# Patient Record
Sex: FEMALE | Race: WHITE | Employment: OTHER | URBAN - METROPOLITAN AREA
[De-identification: names, ages, dates, MRNs, and addresses within clinical notes are randomized per-mention and may not be internally consistent; named-entity substitution may affect disease eponyms.]

---

## 2021-03-07 ENCOUNTER — HOSPITAL ENCOUNTER (OUTPATIENT)
Dept: PREADMISSION TESTING | Age: 86
Discharge: HOME | End: 2021-03-07
Payer: MEDICARE

## 2021-03-07 LAB — SARS-COV-2, COV2: NORMAL

## 2021-03-07 PROCEDURE — U0003 INFECTIOUS AGENT DETECTION BY NUCLEIC ACID (DNA OR RNA); SEVERE ACUTE RESPIRATORY SYNDROME CORONAVIRUS 2 (SARS-COV-2) (CORONAVIRUS DISEASE [COVID-19]), AMPLIFIED PROBE TECHNIQUE, MAKING USE OF HIGH THROUGHPUT TECHNOLOGIES AS DESCRIBED BY CMS-2020-01-R: HCPCS

## 2021-03-08 LAB — SARS-COV-2, COV2NT: NOT DETECTED

## 2021-03-11 ENCOUNTER — HOSPITAL ENCOUNTER (OUTPATIENT)
Age: 86
Setting detail: OUTPATIENT SURGERY
Discharge: HOME | End: 2021-03-11
Attending: INTERNAL MEDICINE | Admitting: INTERNAL MEDICINE
Payer: MEDICARE

## 2021-03-11 ENCOUNTER — APPOINTMENT (OUTPATIENT)
Dept: ENDOSCOPY | Age: 86
End: 2021-03-11
Attending: INTERNAL MEDICINE
Payer: MEDICARE

## 2021-03-11 VITALS
DIASTOLIC BLOOD PRESSURE: 111 MMHG | TEMPERATURE: 97.9 F | BODY MASS INDEX: 31.22 KG/M2 | WEIGHT: 159 LBS | OXYGEN SATURATION: 97 % | SYSTOLIC BLOOD PRESSURE: 188 MMHG | RESPIRATION RATE: 16 BRPM | HEART RATE: 86 BPM | HEIGHT: 60 IN

## 2021-03-11 LAB — SARS-COV-2, COV2: NORMAL

## 2021-03-11 PROCEDURE — 74011250637 HC RX REV CODE- 250/637: Performed by: ANESTHESIOLOGY

## 2021-03-11 PROCEDURE — 74011250636 HC RX REV CODE- 250/636: Performed by: INTERNAL MEDICINE

## 2021-03-11 PROCEDURE — U0003 INFECTIOUS AGENT DETECTION BY NUCLEIC ACID (DNA OR RNA); SEVERE ACUTE RESPIRATORY SYNDROME CORONAVIRUS 2 (SARS-COV-2) (CORONAVIRUS DISEASE [COVID-19]), AMPLIFIED PROBE TECHNIQUE, MAKING USE OF HIGH THROUGHPUT TECHNOLOGIES AS DESCRIBED BY CMS-2020-01-R: HCPCS

## 2021-03-11 RX ORDER — METOPROLOL TARTRATE 25 MG/1
25 TABLET, FILM COATED ORAL 2 TIMES DAILY
COMMUNITY

## 2021-03-11 RX ORDER — CYANOCOBALAMIN 1000 UG/ML
1000 INJECTION, SOLUTION INTRAMUSCULAR; SUBCUTANEOUS ONCE
COMMUNITY

## 2021-03-11 RX ORDER — SODIUM CHLORIDE, SODIUM LACTATE, POTASSIUM CHLORIDE, CALCIUM CHLORIDE 600; 310; 30; 20 MG/100ML; MG/100ML; MG/100ML; MG/100ML
50 INJECTION, SOLUTION INTRAVENOUS CONTINUOUS
Status: DISCONTINUED | OUTPATIENT
Start: 2021-03-11 | End: 2021-03-11 | Stop reason: HOSPADM

## 2021-03-11 RX ORDER — LISINOPRIL 20 MG/1
10 TABLET ORAL DAILY
COMMUNITY

## 2021-03-11 RX ORDER — ASPIRIN 81 MG/1
81 TABLET ORAL DAILY
COMMUNITY

## 2021-03-11 RX ORDER — HYDRALAZINE HYDROCHLORIDE 10 MG/1
10 TABLET, FILM COATED ORAL ONCE
Status: COMPLETED | OUTPATIENT
Start: 2021-03-11 | End: 2021-03-11

## 2021-03-11 RX ADMIN — HYDRALAZINE HYDROCHLORIDE 10 MG: 10 TABLET, FILM COATED ORAL at 09:37

## 2021-03-11 RX ADMIN — SODIUM CHLORIDE, POTASSIUM CHLORIDE, SODIUM LACTATE AND CALCIUM CHLORIDE 50 ML/HR: 600; 310; 30; 20 INJECTION, SOLUTION INTRAVENOUS at 09:37

## 2021-03-11 NOTE — PROGRESS NOTES
Patient reports she has not taken any of her medications including blood pressure medication x 4 days. Dr. Eitan Limon at patient's bedside and notified patient and patient's son that her procedure is cancelled today due to risk to patient from hypertension. Patient and patient's son in agreement to cancel procedure today and reschedule. Dr. Eitan Limon instructed patient to resume home blood pressure medications as previously prescribed. Patient and patient's son verbalize understanding and patient states she is going to take home blood pressure medication as soon as she gets home. New orders received from Dr. Amelia Contreras for patient to return to [facility] at 11:00 a.m. on Monday 3-15-21 for procedure and to continue to hold xarelto before procedure, but to continue home blood pressure medications. Patient and patient's son verbalize understanding of Dr. Celi Russo orders and are in agreement with returning to [facility] on Monday 3-15-21 for procedure.

## 2021-03-11 NOTE — PERIOP NOTES
Patient alert and oriented x 4 with respirations even and unlabored on RA. Patient discharged home per physician's order. Patient transported via wheelchair to front hospital entrance with son present to transport patient via private vehicle.

## 2021-03-11 NOTE — PROGRESS NOTES
Dr Bianca Muir made aware of patient /103 (R), 196/117 (L), 188/111(L). Order for hydralazine 10mg PO received. Will continue to monitor.

## 2021-03-12 LAB — SARS-COV-2, COV2NT: NOT DETECTED

## 2021-03-15 ENCOUNTER — ANESTHESIA (OUTPATIENT)
Dept: ENDOSCOPY | Age: 86
End: 2021-03-15
Payer: MEDICARE

## 2021-03-15 ENCOUNTER — HOSPITAL ENCOUNTER (OUTPATIENT)
Age: 86
Setting detail: OUTPATIENT SURGERY
Discharge: HOME | End: 2021-03-15
Attending: INTERNAL MEDICINE | Admitting: INTERNAL MEDICINE
Payer: MEDICARE

## 2021-03-15 ENCOUNTER — ANESTHESIA EVENT (OUTPATIENT)
Dept: ENDOSCOPY | Age: 86
End: 2021-03-15
Payer: MEDICARE

## 2021-03-15 ENCOUNTER — APPOINTMENT (OUTPATIENT)
Dept: ENDOSCOPY | Age: 86
End: 2021-03-15
Attending: INTERNAL MEDICINE
Payer: MEDICARE

## 2021-03-15 VITALS
WEIGHT: 159 LBS | DIASTOLIC BLOOD PRESSURE: 68 MMHG | RESPIRATION RATE: 18 BRPM | HEART RATE: 65 BPM | TEMPERATURE: 97.3 F | SYSTOLIC BLOOD PRESSURE: 127 MMHG | BODY MASS INDEX: 31.22 KG/M2 | HEIGHT: 60 IN | OXYGEN SATURATION: 95 %

## 2021-03-15 PROCEDURE — 74011000250 HC RX REV CODE- 250: Performed by: NURSE ANESTHETIST, CERTIFIED REGISTERED

## 2021-03-15 PROCEDURE — 74011250636 HC RX REV CODE- 250/636: Performed by: INTERNAL MEDICINE

## 2021-03-15 PROCEDURE — 2709999900 HC NON-CHARGEABLE SUPPLY: Performed by: INTERNAL MEDICINE

## 2021-03-15 PROCEDURE — 74011250636 HC RX REV CODE- 250/636: Performed by: NURSE ANESTHETIST, CERTIFIED REGISTERED

## 2021-03-15 PROCEDURE — 76060000033 HC ANESTHESIA 1 TO 1.5 HR: Performed by: INTERNAL MEDICINE

## 2021-03-15 PROCEDURE — 76040000008: Performed by: INTERNAL MEDICINE

## 2021-03-15 PROCEDURE — 74011250636 HC RX REV CODE- 250/636: Performed by: ANESTHESIOLOGY

## 2021-03-15 RX ORDER — DEXAMETHASONE SODIUM PHOSPHATE 4 MG/ML
INJECTION, SOLUTION INTRA-ARTICULAR; INTRALESIONAL; INTRAMUSCULAR; INTRAVENOUS; SOFT TISSUE
Status: DISCONTINUED
Start: 2021-03-15 | End: 2021-03-15 | Stop reason: HOSPADM

## 2021-03-15 RX ORDER — SODIUM CHLORIDE, SODIUM LACTATE, POTASSIUM CHLORIDE, CALCIUM CHLORIDE 600; 310; 30; 20 MG/100ML; MG/100ML; MG/100ML; MG/100ML
INJECTION, SOLUTION INTRAVENOUS
Status: DISCONTINUED | OUTPATIENT
Start: 2021-03-15 | End: 2021-03-15 | Stop reason: HOSPADM

## 2021-03-15 RX ORDER — PROPOFOL 10 MG/ML
INJECTION, EMULSION INTRAVENOUS AS NEEDED
Status: DISCONTINUED | OUTPATIENT
Start: 2021-03-15 | End: 2021-03-15 | Stop reason: HOSPADM

## 2021-03-15 RX ORDER — SODIUM CHLORIDE, SODIUM LACTATE, POTASSIUM CHLORIDE, CALCIUM CHLORIDE 600; 310; 30; 20 MG/100ML; MG/100ML; MG/100ML; MG/100ML
50 INJECTION, SOLUTION INTRAVENOUS CONTINUOUS
Status: DISCONTINUED | OUTPATIENT
Start: 2021-03-15 | End: 2021-03-15 | Stop reason: HOSPADM

## 2021-03-15 RX ORDER — HYDRALAZINE HYDROCHLORIDE 20 MG/ML
10 INJECTION INTRAMUSCULAR; INTRAVENOUS
Status: COMPLETED | OUTPATIENT
Start: 2021-03-15 | End: 2021-03-15

## 2021-03-15 RX ORDER — LIDOCAINE HYDROCHLORIDE 20 MG/ML
INJECTION, SOLUTION EPIDURAL; INFILTRATION; INTRACAUDAL; PERINEURAL AS NEEDED
Status: DISCONTINUED | OUTPATIENT
Start: 2021-03-15 | End: 2021-03-15 | Stop reason: HOSPADM

## 2021-03-15 RX ORDER — FENTANYL CITRATE 50 UG/ML
INJECTION, SOLUTION INTRAMUSCULAR; INTRAVENOUS
Status: DISCONTINUED
Start: 2021-03-15 | End: 2021-03-15 | Stop reason: WASHOUT

## 2021-03-15 RX ORDER — DEXMEDETOMIDINE HYDROCHLORIDE 100 UG/ML
INJECTION, SOLUTION INTRAVENOUS
Status: COMPLETED
Start: 2021-03-15 | End: 2021-03-15

## 2021-03-15 RX ORDER — DEXMEDETOMIDINE HYDROCHLORIDE 100 UG/ML
INJECTION, SOLUTION INTRAVENOUS AS NEEDED
Status: DISCONTINUED | OUTPATIENT
Start: 2021-03-15 | End: 2021-03-15 | Stop reason: HOSPADM

## 2021-03-15 RX ORDER — PROPOFOL 10 MG/ML
INJECTION, EMULSION INTRAVENOUS
Status: COMPLETED
Start: 2021-03-15 | End: 2021-03-15

## 2021-03-15 RX ORDER — LIDOCAINE HYDROCHLORIDE 20 MG/ML
INJECTION, SOLUTION EPIDURAL; INFILTRATION; INTRACAUDAL; PERINEURAL
Status: COMPLETED
Start: 2021-03-15 | End: 2021-03-15

## 2021-03-15 RX ADMIN — PROPOFOL 70 MG: 10 INJECTION, EMULSION INTRAVENOUS at 12:20

## 2021-03-15 RX ADMIN — HYDRALAZINE HYDROCHLORIDE 10 MG: 20 INJECTION INTRAMUSCULAR; INTRAVENOUS at 11:34

## 2021-03-15 RX ADMIN — PHENYLEPHRINE HYDROCHLORIDE 100 MCG: 10 INJECTION INTRAVENOUS at 12:30

## 2021-03-15 RX ADMIN — SODIUM CHLORIDE, POTASSIUM CHLORIDE, SODIUM LACTATE AND CALCIUM CHLORIDE 50 ML/HR: 600; 310; 30; 20 INJECTION, SOLUTION INTRAVENOUS at 11:35

## 2021-03-15 RX ADMIN — PROPOFOL 30 MG: 10 INJECTION, EMULSION INTRAVENOUS at 12:39

## 2021-03-15 RX ADMIN — DEXMEDETOMIDINE HYDROCHLORIDE 10 MCG: 100 INJECTION, SOLUTION INTRAVENOUS at 12:24

## 2021-03-15 RX ADMIN — DEXMEDETOMIDINE HYDROCHLORIDE 10 MCG: 100 INJECTION, SOLUTION INTRAVENOUS at 12:38

## 2021-03-15 RX ADMIN — LIDOCAINE HYDROCHLORIDE 80 MG: 20 INJECTION, SOLUTION EPIDURAL; INFILTRATION; INTRACAUDAL; PERINEURAL at 12:20

## 2021-03-15 RX ADMIN — PHENYLEPHRINE HYDROCHLORIDE 100 MCG: 10 INJECTION INTRAVENOUS at 12:54

## 2021-03-15 RX ADMIN — PHENYLEPHRINE HYDROCHLORIDE 100 MCG: 10 INJECTION INTRAVENOUS at 12:38

## 2021-03-15 RX ADMIN — PHENYLEPHRINE HYDROCHLORIDE 100 MCG: 10 INJECTION INTRAVENOUS at 12:28

## 2021-03-15 RX ADMIN — PHENYLEPHRINE HYDROCHLORIDE 100 MCG: 10 INJECTION INTRAVENOUS at 12:50

## 2021-03-15 RX ADMIN — PROPOFOL 30 MG: 10 INJECTION, EMULSION INTRAVENOUS at 12:46

## 2021-03-15 RX ADMIN — PROPOFOL 40 MG: 10 INJECTION, EMULSION INTRAVENOUS at 12:25

## 2021-03-15 RX ADMIN — SODIUM CHLORIDE, POTASSIUM CHLORIDE, SODIUM LACTATE AND CALCIUM CHLORIDE: 600; 310; 30; 20 INJECTION, SOLUTION INTRAVENOUS at 12:06

## 2021-03-15 RX ADMIN — PROPOFOL 40 MG: 10 INJECTION, EMULSION INTRAVENOUS at 12:30

## 2021-03-15 NOTE — ANESTHESIA PREPROCEDURE EVALUATION
Relevant Problems   No relevant active problems       Anesthetic History   No history of anesthetic complications            Review of Systems / Medical History  Patient summary reviewed, nursing notes reviewed and pertinent labs reviewed    Pulmonary  Within defined limits                 Neuro/Psych       CVA  TIA     Cardiovascular    Hypertension        Dysrhythmias   CAD, PAD and hyperlipidemia         GI/Hepatic/Renal                Endo/Other             Other Findings   Comments: Allergies  Cucumber  Ht: 5' (152.4 cm)  Weight: 72.1 kg (159 lb)  BMI: 31.05 kg/m²  Procedure  ENDOSCOPIC ULTRASOUND WITH FINE NEEDLE ASPIRATION (N/A [facility] GI [facility])  Pre-Proc, [facility] END[facility] 03      Medical History  CAD (coronary artery disease)  Hypertension  Stroke ([facility] [address]  A-fib (HCC)  Hyperlipidemia  PVD (peripheral vascular disease) ([facility] [address]  CAD (coronary artery disease)  Carotid stent occlusion ([facility])           Physical Exam    Airway  Mallampati: II  TM Distance: 4 - 6 cm  Neck ROM: normal range of motion   Mouth opening: Normal     Cardiovascular    Rhythm: regular  Rate: normal         Dental  No notable dental hx       Pulmonary  Breath sounds clear to auscultation               Abdominal  GI exam deferred       Other Findings            Anesthetic Plan    ASA: 3  Anesthesia type: total IV anesthesia and general          Induction: Intravenous  Anesthetic plan and risks discussed with: Patient      General anesthesia was prescribed for this patient because by definition it is \"a drug-induced loss of consciousness during which patients are not arousable, even by painful stimulation. \" Sometimes, the ability to independently maintain ventilatory function is often impaired and patients often require assistance in maintaining a patent airway. Occasionally, positive pressure ventilation may be required because of depressed spontaneous ventilation or drug-induced depression of neuromuscular function.  This depth of anesthesia is preferred for endoscopic procedures to facilitate the procedure and for patient safety/quality of care.

## 2021-03-15 NOTE — ANESTHESIA POSTPROCEDURE EVALUATION
Procedure(s):  ENDOSCOPIC ULTRASOUND WITH FINE NEEDLE ASPIRATION.     total IV anesthesia, general    Anesthesia Post Evaluation      Multimodal analgesia: multimodal analgesia not used between 6 hours prior to anesthesia start to PACU discharge  Patient location during evaluation: bedside  Patient participation: waiting for patient participation  Level of consciousness: sleepy but conscious  Pain score: 0  Airway patency: patent  Anesthetic complications: no  Cardiovascular status: stable  Respiratory status: spontaneous ventilation  Hydration status: stable    Final Post Anesthesia Temperature Assessment:  Normothermia (36.0-37.5 degrees C)      INITIAL Post-op Vital signs:   Vitals Value Taken Time   /74 03/15/21 1255   Temp 36.5 °C (97.7 °F) 03/15/21 1255   Pulse 69 03/15/21 1255   Resp 22 03/15/21 1255   SpO2 100 % 03/15/21 1255

## 2021-03-15 NOTE — PERIOP NOTES
Dr. Nito Sol made aware of elevated blood pressure 187/107 in preop. 10mg IV hydralazine ordered and given by RN.

## 2021-03-15 NOTE — PROGRESS NOTES
Bolus given as ordered. Pt up to dress. No complaints. DC instructions reviewed verb an understanding.

## 2021-09-24 ENCOUNTER — TRANSCRIBE ORDER (OUTPATIENT)
Dept: SCHEDULING | Age: 86
End: 2021-09-24

## 2021-09-24 DIAGNOSIS — R19.4 FREQUENT BOWEL MOVEMENTS: ICD-10-CM

## 2021-09-24 DIAGNOSIS — R10.9 ABDOMINAL PAIN: ICD-10-CM

## 2021-09-24 DIAGNOSIS — K86.2 CYST AND PSEUDOCYST OF PANCREAS: Primary | ICD-10-CM

## 2021-09-24 DIAGNOSIS — K86.3 CYST AND PSEUDOCYST OF PANCREAS: Primary | ICD-10-CM

## 2021-10-19 ENCOUNTER — HOSPITAL ENCOUNTER (OUTPATIENT)
Dept: CT IMAGING | Age: 86
Discharge: HOME | End: 2021-10-19
Payer: MEDICARE

## 2021-10-19 DIAGNOSIS — R10.9 ABDOMINAL PAIN: ICD-10-CM

## 2021-10-19 DIAGNOSIS — K86.2 CYST AND PSEUDOCYST OF PANCREAS: ICD-10-CM

## 2021-10-19 DIAGNOSIS — K86.3 CYST AND PSEUDOCYST OF PANCREAS: ICD-10-CM

## 2021-10-19 DIAGNOSIS — R19.4 FREQUENT BOWEL MOVEMENTS: ICD-10-CM

## 2021-10-19 LAB — CREAT BLD-MCNC: 0.9 MG/DL (ref 0.6–1.3)

## 2021-10-19 PROCEDURE — 74178 CT ABD&PLV WO CNTR FLWD CNTR: CPT

## 2021-10-19 PROCEDURE — 82565 ASSAY OF CREATININE: CPT

## 2021-10-19 PROCEDURE — 74011000636 HC RX REV CODE- 636

## 2021-10-19 RX ADMIN — IOPAMIDOL 100 ML: 755 INJECTION, SOLUTION INTRAVENOUS at 08:35

## 2023-05-12 RX ORDER — CYANOCOBALAMIN 1000 UG/ML
INJECTION, SOLUTION INTRAMUSCULAR; SUBCUTANEOUS ONCE
COMMUNITY

## 2023-05-12 RX ORDER — LISINOPRIL 20 MG/1
10 TABLET ORAL DAILY
COMMUNITY

## 2023-05-12 RX ORDER — ASPIRIN 81 MG/1
81 TABLET ORAL DAILY
COMMUNITY

## 2024-01-16 ENCOUNTER — HOSPITAL ENCOUNTER (INPATIENT)
Facility: HOSPITAL | Age: 89
LOS: 8 days | Discharge: NURSING, REHAB OR LONG-TERM CARE | DRG: 291 | End: 2024-01-24
Attending: EMERGENCY MEDICINE | Admitting: HOSPITALIST
Payer: MEDICARE

## 2024-01-16 ENCOUNTER — APPOINTMENT (OUTPATIENT)
Facility: HOSPITAL | Age: 89
DRG: 291 | End: 2024-01-16
Payer: MEDICARE

## 2024-01-16 DIAGNOSIS — J96.01 ACUTE RESPIRATORY FAILURE WITH HYPOXIA (HCC): Primary | ICD-10-CM

## 2024-01-16 DIAGNOSIS — D64.9 ANEMIA, UNSPECIFIED TYPE: ICD-10-CM

## 2024-01-16 PROBLEM — I10 ACCELERATED ESSENTIAL HYPERTENSION: Status: ACTIVE | Noted: 2024-01-16

## 2024-01-16 LAB
ALBUMIN SERPL-MCNC: 3.5 G/DL (ref 3.5–5)
ALBUMIN/GLOB SERPL: 1.2 (ref 1.1–2.2)
ALP SERPL-CCNC: 84 U/L (ref 45–117)
ALT SERPL-CCNC: 14 U/L (ref 12–78)
ANION GAP SERPL CALC-SCNC: 5 MMOL/L (ref 5–15)
AST SERPL W P-5'-P-CCNC: 14 U/L (ref 15–37)
BASOPHILS # BLD: 0.1 K/UL (ref 0–0.1)
BASOPHILS NFR BLD: 2 % (ref 0–1)
BILIRUB SERPL-MCNC: 0.7 MG/DL (ref 0.2–1)
BNP SERPL-MCNC: 4759 PG/ML
BUN SERPL-MCNC: 22 MG/DL (ref 6–20)
BUN/CREAT SERPL: 29 (ref 12–20)
CA-I BLD-MCNC: 8.9 MG/DL (ref 8.5–10.1)
CHLORIDE SERPL-SCNC: 105 MMOL/L (ref 97–108)
CO2 SERPL-SCNC: 30 MMOL/L (ref 21–32)
COLLECT DATE STL: NORMAL
CREAT SERPL-MCNC: 0.77 MG/DL (ref 0.55–1.02)
DIFFERENTIAL METHOD BLD: ABNORMAL
EKG ATRIAL RATE: 87 BPM
EKG DIAGNOSIS: NORMAL
EKG Q-T INTERVAL: 426 MS
EKG QRS DURATION: 92 MS
EKG QTC CALCULATION (BAZETT): 491 MS
EKG R AXIS: 29 DEGREES
EKG T AXIS: 52 DEGREES
EKG VENTRICULAR RATE: 80 BPM
EOSINOPHIL # BLD: 0.2 K/UL (ref 0–0.4)
EOSINOPHIL NFR BLD: 3 % (ref 0–7)
ERYTHROCYTE [DISTWIDTH] IN BLOOD BY AUTOMATED COUNT: 22.3 % (ref 11.5–14.5)
GLOBULIN SER CALC-MCNC: 2.9 G/DL (ref 2–4)
GLUCOSE SERPL-MCNC: 116 MG/DL (ref 65–100)
HCT VFR BLD AUTO: 27.2 % (ref 35–47)
HEMOCCULT SP1 STL QL: NEGATIVE
HGB BLD-MCNC: 6.4 G/DL (ref 11.5–16)
IMM GRANULOCYTES # BLD AUTO: 0 K/UL (ref 0–0.04)
IMM GRANULOCYTES NFR BLD AUTO: 0 % (ref 0–0.5)
LDH SERPL L TO P-CCNC: 232 U/L (ref 81–246)
LYMPHOCYTES # BLD: 0.9 K/UL (ref 0.8–3.5)
LYMPHOCYTES NFR BLD: 17 % (ref 12–49)
MCH RBC QN AUTO: 15.4 PG (ref 26–34)
MCHC RBC AUTO-ENTMCNC: 23.5 G/DL (ref 30–36.5)
MCV RBC AUTO: 65.4 FL (ref 80–99)
MONOCYTES # BLD: 0.6 K/UL (ref 0–1)
MONOCYTES NFR BLD: 11 % (ref 5–13)
NEUTS SEG # BLD: 3.3 K/UL (ref 1.8–8)
NEUTS SEG NFR BLD: 67 % (ref 32–75)
NRBC # BLD: 0.07 K/UL (ref 0–0.01)
NRBC BLD-RTO: 1.4 PER 100 WBC
PLATELET # BLD AUTO: 191 K/UL (ref 150–400)
POTASSIUM SERPL-SCNC: 4.3 MMOL/L (ref 3.5–5.1)
PROT SERPL-MCNC: 6.4 G/DL (ref 6.4–8.2)
RBC # BLD AUTO: 4.16 M/UL (ref 3.8–5.2)
RBC MORPH BLD: ABNORMAL
RETICS # AUTO: 0.04 M/UL (ref 0.02–0.08)
RETICS/RBC NFR AUTO: 1 % (ref 0.7–2.1)
SODIUM SERPL-SCNC: 140 MMOL/L (ref 136–145)
TROPONIN I SERPL HS-MCNC: 21 NG/L (ref 0–51)
WBC # BLD AUTO: 5.1 K/UL (ref 3.6–11)

## 2024-01-16 PROCEDURE — 36415 COLL VENOUS BLD VENIPUNCTURE: CPT

## 2024-01-16 PROCEDURE — 82607 VITAMIN B-12: CPT

## 2024-01-16 PROCEDURE — 85025 COMPLETE CBC W/AUTO DIFF WBC: CPT

## 2024-01-16 PROCEDURE — 83540 ASSAY OF IRON: CPT

## 2024-01-16 PROCEDURE — 86901 BLOOD TYPING SEROLOGIC RH(D): CPT

## 2024-01-16 PROCEDURE — 2580000003 HC RX 258: Performed by: HOSPITALIST

## 2024-01-16 PROCEDURE — 82746 ASSAY OF FOLIC ACID SERUM: CPT

## 2024-01-16 PROCEDURE — 99285 EMERGENCY DEPT VISIT HI MDM: CPT

## 2024-01-16 PROCEDURE — 86850 RBC ANTIBODY SCREEN: CPT

## 2024-01-16 PROCEDURE — 82272 OCCULT BLD FECES 1-3 TESTS: CPT

## 2024-01-16 PROCEDURE — 71275 CT ANGIOGRAPHY CHEST: CPT

## 2024-01-16 PROCEDURE — 82728 ASSAY OF FERRITIN: CPT

## 2024-01-16 PROCEDURE — 6370000000 HC RX 637 (ALT 250 FOR IP): Performed by: HOSPITALIST

## 2024-01-16 PROCEDURE — 86900 BLOOD TYPING SEROLOGIC ABO: CPT

## 2024-01-16 PROCEDURE — P9016 RBC LEUKOCYTES REDUCED: HCPCS

## 2024-01-16 PROCEDURE — 86923 COMPATIBILITY TEST ELECTRIC: CPT

## 2024-01-16 PROCEDURE — 6360000004 HC RX CONTRAST MEDICATION: Performed by: EMERGENCY MEDICINE

## 2024-01-16 PROCEDURE — 83615 LACTATE (LD) (LDH) ENZYME: CPT

## 2024-01-16 PROCEDURE — 36430 TRANSFUSION BLD/BLD COMPNT: CPT

## 2024-01-16 PROCEDURE — 6360000002 HC RX W HCPCS: Performed by: HOSPITALIST

## 2024-01-16 PROCEDURE — 83880 ASSAY OF NATRIURETIC PEPTIDE: CPT

## 2024-01-16 PROCEDURE — 84484 ASSAY OF TROPONIN QUANT: CPT

## 2024-01-16 PROCEDURE — B24BZZZ ULTRASONOGRAPHY OF HEART WITH AORTA: ICD-10-PCS | Performed by: HOSPITALIST

## 2024-01-16 PROCEDURE — 71045 X-RAY EXAM CHEST 1 VIEW: CPT

## 2024-01-16 PROCEDURE — 93005 ELECTROCARDIOGRAM TRACING: CPT | Performed by: EMERGENCY MEDICINE

## 2024-01-16 PROCEDURE — 1100000000 HC RM PRIVATE

## 2024-01-16 PROCEDURE — 80053 COMPREHEN METABOLIC PANEL: CPT

## 2024-01-16 PROCEDURE — 74176 CT ABD & PELVIS W/O CONTRAST: CPT

## 2024-01-16 PROCEDURE — 85045 AUTOMATED RETICULOCYTE COUNT: CPT

## 2024-01-16 RX ORDER — GUAIFENESIN 600 MG/1
600 TABLET, EXTENDED RELEASE ORAL 2 TIMES DAILY
Status: DISCONTINUED | OUTPATIENT
Start: 2024-01-16 | End: 2024-01-24 | Stop reason: HOSPADM

## 2024-01-16 RX ORDER — SODIUM CHLORIDE 9 MG/ML
INJECTION, SOLUTION INTRAVENOUS PRN
Status: DISCONTINUED | OUTPATIENT
Start: 2024-01-16 | End: 2024-01-24 | Stop reason: HOSPADM

## 2024-01-16 RX ORDER — METOPROLOL TARTRATE 50 MG/1
50 TABLET, FILM COATED ORAL 2 TIMES DAILY
Status: ON HOLD | COMMUNITY
Start: 2023-11-02 | End: 2024-01-24 | Stop reason: HOSPADM

## 2024-01-16 RX ORDER — EZETIMIBE 10 MG/1
10 TABLET ORAL DAILY
COMMUNITY
Start: 2023-11-02

## 2024-01-16 RX ORDER — CHLORTHALIDONE 25 MG/1
25 TABLET ORAL DAILY
Status: DISCONTINUED | OUTPATIENT
Start: 2024-01-16 | End: 2024-01-24

## 2024-01-16 RX ORDER — PRAVASTATIN SODIUM 40 MG
40 TABLET ORAL DAILY
COMMUNITY
Start: 2023-11-02

## 2024-01-16 RX ORDER — FLUTICASONE PROPIONATE 50 MCG
1 SPRAY, SUSPENSION (ML) NASAL DAILY
Status: DISCONTINUED | OUTPATIENT
Start: 2024-01-16 | End: 2024-01-24 | Stop reason: HOSPADM

## 2024-01-16 RX ORDER — DOXYCYCLINE HYCLATE 100 MG/1
100 CAPSULE ORAL EVERY 12 HOURS SCHEDULED
Status: DISCONTINUED | OUTPATIENT
Start: 2024-01-16 | End: 2024-01-24 | Stop reason: HOSPADM

## 2024-01-16 RX ORDER — ISOSORBIDE MONONITRATE 30 MG/1
30 TABLET, EXTENDED RELEASE ORAL DAILY
Status: DISCONTINUED | OUTPATIENT
Start: 2024-01-16 | End: 2024-01-24 | Stop reason: HOSPADM

## 2024-01-16 RX ORDER — LISINOPRIL 10 MG/1
10 TABLET ORAL DAILY
COMMUNITY
Start: 2023-11-02

## 2024-01-16 RX ADMIN — GUAIFENESIN 600 MG: 600 TABLET, EXTENDED RELEASE ORAL at 22:05

## 2024-01-16 RX ADMIN — DOXYCYCLINE HYCLATE 100 MG: 100 CAPSULE ORAL at 22:05

## 2024-01-16 RX ADMIN — CEFTRIAXONE SODIUM 1000 MG: 1 INJECTION, POWDER, FOR SOLUTION INTRAMUSCULAR; INTRAVENOUS at 22:07

## 2024-01-16 RX ADMIN — IOPAMIDOL 100 ML: 755 INJECTION, SOLUTION INTRAVENOUS at 19:18

## 2024-01-16 RX ADMIN — ISOSORBIDE MONONITRATE 30 MG: 30 TABLET, EXTENDED RELEASE ORAL at 22:48

## 2024-01-16 ASSESSMENT — PAIN - FUNCTIONAL ASSESSMENT: PAIN_FUNCTIONAL_ASSESSMENT: NONE - DENIES PAIN

## 2024-01-16 ASSESSMENT — LIFESTYLE VARIABLES
HOW OFTEN DO YOU HAVE A DRINK CONTAINING ALCOHOL: NEVER
HOW MANY STANDARD DRINKS CONTAINING ALCOHOL DO YOU HAVE ON A TYPICAL DAY: PATIENT DOES NOT DRINK

## 2024-01-16 NOTE — CONSENT
Informed Consent for Blood Component Transfusion Note    I have discussed with the patient the rationale for blood component transfusion; its benefits in treating or preventing fatigue, organ damage, or death; and its risk which includes mild transfusion reactions, rare risk of blood borne infection, or more serious but rare reactions. I have discussed the alternatives to transfusion, including the risk and consequences of not receiving transfusion. The patient had an opportunity to ask questions and had agreed to proceed with transfusion of blood components.    Electronically signed by He Musa MD on 1/16/24 at 6:09 PM EST

## 2024-01-16 NOTE — ED TRIAGE NOTES
Per EMS, pt had previous back pain that was rated 10/10, going on for months prior to arrival. Pt was 88% in 2L NC by EMS and was brought up to 96%. . Pt is hypertensive 210/145 per ems, hx of hypertension.Pt denies any falls recently, states managing her meds at home.

## 2024-01-17 ENCOUNTER — APPOINTMENT (OUTPATIENT)
Facility: HOSPITAL | Age: 89
DRG: 291 | End: 2024-01-17
Attending: HOSPITALIST
Payer: MEDICARE

## 2024-01-17 ENCOUNTER — APPOINTMENT (OUTPATIENT)
Facility: HOSPITAL | Age: 89
DRG: 291 | End: 2024-01-17
Payer: MEDICARE

## 2024-01-17 LAB
25(OH)D3 SERPL-MCNC: 35.3 NG/ML (ref 30–100)
ABO + RH BLD: NORMAL
APPEARANCE FLD: ABNORMAL
BLD PROD TYP BPU: NORMAL
BLOOD BANK DISPENSE STATUS: NORMAL
BLOOD GROUP ANTIBODIES SERPL: NEGATIVE
BPU ID: NORMAL
CK SERPL-CCNC: 26 U/L (ref 26–192)
COLOR FLD: YELLOW
CROSSMATCH RESULT: NORMAL
CRP SERPL-MCNC: <0.29 MG/DL (ref 0–0.6)
ECHO AO ASC DIAM: 2.7 CM
ECHO AO ASCENDING AORTA INDEX: 1.62 CM/M2
ECHO AO ROOT DIAM: 2.7 CM
ECHO AO ROOT INDEX: 1.62 CM/M2
ECHO AR MAX VEL PISA: 1.2 M/S
ECHO AV MEAN GRADIENT: 6 MMHG
ECHO AV MEAN VELOCITY: 1.1 M/S
ECHO AV PEAK GRADIENT: 13 MMHG
ECHO AV PEAK VELOCITY: 1.8 M/S
ECHO AV REGURGITANT PHT: 97 MS
ECHO AV VELOCITY RATIO: 0.39
ECHO AV VTI: 29.9 CM
ECHO BSA: 1.71 M2
ECHO EST RA PRESSURE: 8 MMHG
ECHO IVC EXP: 1.8 CM
ECHO IVC INSP: 1.6 CM
ECHO LA AREA 2C: 21.6 CM2
ECHO LA AREA 4C: 34.1 CM2
ECHO LA DIAMETER INDEX: 2.04 CM/M2
ECHO LA DIAMETER: 3.4 CM
ECHO LA MAJOR AXIS: 6.1 CM
ECHO LA MINOR AXIS: 5.5 CM
ECHO LA TO AORTIC ROOT RATIO: 1.26
ECHO LA VOL BP: 103 ML (ref 22–52)
ECHO LA VOL MOD A2C: 70 ML (ref 22–52)
ECHO LA VOL MOD A4C: 144 ML (ref 22–52)
ECHO LA VOL/BSA BIPLANE: 62 ML/M2 (ref 16–34)
ECHO LA VOLUME INDEX MOD A2C: 42 ML/M2 (ref 16–34)
ECHO LA VOLUME INDEX MOD A4C: 86 ML/M2 (ref 16–34)
ECHO LV E' LATERAL VELOCITY: 14 CM/S
ECHO LV E' SEPTAL VELOCITY: 6 CM/S
ECHO LV EDV A2C: 48 ML
ECHO LV EDV A4C: 40 ML
ECHO LV EDV INDEX A4C: 24 ML/M2
ECHO LV EDV NDEX A2C: 29 ML/M2
ECHO LV EJECTION FRACTION A2C: 54 %
ECHO LV EJECTION FRACTION A4C: 54 %
ECHO LV EJECTION FRACTION BIPLANE: 52 % (ref 55–100)
ECHO LV ESV A2C: 23 ML
ECHO LV ESV A4C: 18 ML
ECHO LV ESV INDEX A2C: 14 ML/M2
ECHO LV ESV INDEX A4C: 11 ML/M2
ECHO LV FRACTIONAL SHORTENING: 48 % (ref 28–44)
ECHO LV INTERNAL DIMENSION DIASTOLE INDEX: 2.4 CM/M2
ECHO LV INTERNAL DIMENSION DIASTOLIC MMODE: 4.7 CM (ref 3.9–5.3)
ECHO LV INTERNAL DIMENSION DIASTOLIC: 4 CM (ref 3.9–5.3)
ECHO LV INTERNAL DIMENSION SYSTOLIC INDEX: 1.26 CM/M2
ECHO LV INTERNAL DIMENSION SYSTOLIC MMODE: 3.2 CM
ECHO LV INTERNAL DIMENSION SYSTOLIC: 2.1 CM
ECHO LV IVSD MMODE: 1.6 CM (ref 0.6–0.9)
ECHO LV IVSD: 1.5 CM (ref 0.6–0.9)
ECHO LV MASS 2D: 175.8 G (ref 67–162)
ECHO LV MASS INDEX 2D: 105.3 G/M2 (ref 43–95)
ECHO LV POSTERIOR WALL DIASTOLIC MMODE: 1.1 CM (ref 0.6–0.9)
ECHO LV POSTERIOR WALL DIASTOLIC: 1 CM (ref 0.6–0.9)
ECHO LV RELATIVE WALL THICKNESS RATIO: 0.5
ECHO LVOT AV VTI INDEX: 0.54
ECHO LVOT MEAN GRADIENT: 1 MMHG
ECHO LVOT PEAK GRADIENT: 2 MMHG
ECHO LVOT PEAK VELOCITY: 0.7 M/S
ECHO LVOT VTI: 16.2 CM
ECHO MV A VELOCITY: 0.5 M/S
ECHO MV E VELOCITY: 1.39 M/S
ECHO MV E/A RATIO: 2.78
ECHO MV E/E' LATERAL: 9.93
ECHO MV E/E' RATIO (AVERAGED): 16.55
ECHO MV REGURGITANT PEAK GRADIENT: 100 MMHG
ECHO MV REGURGITANT PEAK VELOCITY: 5 M/S
ECHO MV REGURGITANT VTIA: 141 CM
ECHO PULMONARY ARTERY END DIASTOLIC PRESSURE: 7 MMHG
ECHO PV MAX VELOCITY: 1.1 M/S
ECHO PV MEAN GRADIENT: 3 MMHG
ECHO PV MEAN VELOCITY: 0.7 M/S
ECHO PV PEAK GRADIENT: 5 MMHG
ECHO PV REGURGITANT MAX VELOCITY: 1.4 M/S
ECHO PV VTI: 20.8 CM
ECHO PVEIN PEAK D VELOCITY: 0.5 M/S
ECHO PVEIN PEAK S VELOCITY: 0.4 M/S
ECHO PVEIN S/D RATIO: 0.8 NO UNITS
ECHO RA AREA 4C: 19.8 CM2
ECHO RA END SYSTOLIC VOLUME APICAL 4 CHAMBER INDEX BSA: 37 ML/M2
ECHO RA VOLUME: 62 ML
ECHO RIGHT VENTRICULAR SYSTOLIC PRESSURE (RVSP): 34 MMHG
ECHO RV BASAL DIMENSION: 6.1 CM
ECHO RV FREE WALL PEAK S': 9 CM/S
ECHO RV MID DIMENSION: 4 CM
ECHO RV TAPSE: 1.2 CM (ref 1.7–?)
ECHO TV REGURGITANT MAX VELOCITY: 2.57 M/S
ECHO TV REGURGITANT PEAK GRADIENT: 26 MMHG
EOSINOPHIL NFR FLD MANUAL: 0 %
EOSINOPHIL NFR FLD MANUAL: 0 %
ERYTHROCYTE [SEDIMENTATION RATE] IN BLOOD: 6 MM/HR (ref 0–30)
FERRITIN SERPL-MCNC: 21 NG/ML (ref 8–252)
FOLATE SERPL-MCNC: 41.4 NG/ML (ref 5–21)
HCT VFR BLD AUTO: 27.7 % (ref 35–47)
HGB BLD-MCNC: 7 G/DL (ref 11.5–16)
INR PPP: 1.8 (ref 0.9–1.1)
IRON SATN MFR SERPL: 3 % (ref 20–50)
IRON SERPL-MCNC: 14 UG/DL (ref 35–150)
LYMPHOCYTES NFR FLD: 15 %
LYMPHOCYTES NFR FLD: 15 %
MESOTHL CELL NFR FLD: 10 %
MONOCYTES NFR FLD: 40 %
MONOCYTES NFR FLD: 50 %
MONOS+MACROS NFR FLD: 12 %
NEUTROPHILS NFR FLD: 23 %
NEUTROPHILS NFR FLD: 23 %
NEUTS BAND # FLD: 0 %
NEUTS BAND # FLD: 0 %
NUC CELL # FLD: 82 /CU MM
PROCALCITONIN SERPL-MCNC: <0.05 NG/ML
PROTHROMBIN TIME: 21 SEC (ref 11.9–14.6)
RBC # FLD: >100 /CU MM
SPECIMEN EXP DATE BLD: NORMAL
SPECIMEN SOURCE FLD: ABNORMAL
TIBC SERPL-MCNC: 533 UG/DL (ref 250–450)
TRANSFUSION STATUS PATIENT QL: NORMAL
TSH SERPL DL<=0.05 MIU/L-ACNC: 3.15 UIU/ML (ref 0.36–3.74)
UNIT DIVISION: 0
URATE SERPL-MCNC: 4.4 MG/DL (ref 2.6–6)
VIT B12 SERPL-MCNC: 837 PG/ML (ref 193–986)

## 2024-01-17 PROCEDURE — 92610 EVALUATE SWALLOWING FUNCTION: CPT

## 2024-01-17 PROCEDURE — 2700000000 HC OXYGEN THERAPY PER DAY

## 2024-01-17 PROCEDURE — 82550 ASSAY OF CK (CPK): CPT

## 2024-01-17 PROCEDURE — 71045 X-RAY EXAM CHEST 1 VIEW: CPT

## 2024-01-17 PROCEDURE — C9113 INJ PANTOPRAZOLE SODIUM, VIA: HCPCS | Performed by: HOSPITALIST

## 2024-01-17 PROCEDURE — 87205 SMEAR GRAM STAIN: CPT

## 2024-01-17 PROCEDURE — 1100000000 HC RM PRIVATE

## 2024-01-17 PROCEDURE — 85018 HEMOGLOBIN: CPT

## 2024-01-17 PROCEDURE — 84145 PROCALCITONIN (PCT): CPT

## 2024-01-17 PROCEDURE — 6370000000 HC RX 637 (ALT 250 FOR IP): Performed by: HOSPITALIST

## 2024-01-17 PROCEDURE — 87070 CULTURE OTHR SPECIMN AEROBIC: CPT

## 2024-01-17 PROCEDURE — 85610 PROTHROMBIN TIME: CPT

## 2024-01-17 PROCEDURE — 84550 ASSAY OF BLOOD/URIC ACID: CPT

## 2024-01-17 PROCEDURE — 86140 C-REACTIVE PROTEIN: CPT

## 2024-01-17 PROCEDURE — 85652 RBC SED RATE AUTOMATED: CPT

## 2024-01-17 PROCEDURE — 89051 BODY FLUID CELL COUNT: CPT

## 2024-01-17 PROCEDURE — 82306 VITAMIN D 25 HYDROXY: CPT

## 2024-01-17 PROCEDURE — 36415 COLL VENOUS BLD VENIPUNCTURE: CPT

## 2024-01-17 PROCEDURE — 2580000003 HC RX 258: Performed by: HOSPITALIST

## 2024-01-17 PROCEDURE — 87015 SPECIMEN INFECT AGNT CONCNTJ: CPT

## 2024-01-17 PROCEDURE — C1729 CATH, DRAINAGE: HCPCS

## 2024-01-17 PROCEDURE — 93306 TTE W/DOPPLER COMPLETE: CPT

## 2024-01-17 PROCEDURE — 6360000002 HC RX W HCPCS: Performed by: HOSPITALIST

## 2024-01-17 PROCEDURE — 88305 TISSUE EXAM BY PATHOLOGIST: CPT

## 2024-01-17 PROCEDURE — 88112 CYTOPATH CELL ENHANCE TECH: CPT

## 2024-01-17 PROCEDURE — 84443 ASSAY THYROID STIM HORMONE: CPT

## 2024-01-17 PROCEDURE — 94761 N-INVAS EAR/PLS OXIMETRY MLT: CPT

## 2024-01-17 PROCEDURE — 0W993ZZ DRAINAGE OF RIGHT PLEURAL CAVITY, PERCUTANEOUS APPROACH: ICD-10-PCS | Performed by: INTERNAL MEDICINE

## 2024-01-17 PROCEDURE — 85014 HEMATOCRIT: CPT

## 2024-01-17 RX ORDER — ONDANSETRON 2 MG/ML
4 INJECTION INTRAMUSCULAR; INTRAVENOUS EVERY 6 HOURS PRN
Status: DISCONTINUED | OUTPATIENT
Start: 2024-01-17 | End: 2024-01-24 | Stop reason: HOSPADM

## 2024-01-17 RX ORDER — PRAVASTATIN SODIUM 40 MG
40 TABLET ORAL NIGHTLY
Status: DISCONTINUED | OUTPATIENT
Start: 2024-01-17 | End: 2024-01-24 | Stop reason: HOSPADM

## 2024-01-17 RX ORDER — SODIUM CHLORIDE 0.9 % (FLUSH) 0.9 %
5-40 SYRINGE (ML) INJECTION PRN
Status: DISCONTINUED | OUTPATIENT
Start: 2024-01-17 | End: 2024-01-24 | Stop reason: HOSPADM

## 2024-01-17 RX ORDER — ACETAMINOPHEN 650 MG/1
650 SUPPOSITORY RECTAL EVERY 6 HOURS PRN
Status: DISCONTINUED | OUTPATIENT
Start: 2024-01-17 | End: 2024-01-24 | Stop reason: HOSPADM

## 2024-01-17 RX ORDER — ACETAMINOPHEN 325 MG/1
650 TABLET ORAL EVERY 8 HOURS PRN
Status: DISCONTINUED | OUTPATIENT
Start: 2024-01-17 | End: 2024-01-17 | Stop reason: SDUPTHER

## 2024-01-17 RX ORDER — ONDANSETRON 4 MG/1
4 TABLET, ORALLY DISINTEGRATING ORAL EVERY 8 HOURS PRN
Status: DISCONTINUED | OUTPATIENT
Start: 2024-01-17 | End: 2024-01-24 | Stop reason: HOSPADM

## 2024-01-17 RX ORDER — LISINOPRIL 10 MG/1
20 TABLET ORAL DAILY
Status: DISCONTINUED | OUTPATIENT
Start: 2024-01-17 | End: 2024-01-24

## 2024-01-17 RX ORDER — BENZONATATE 100 MG/1
100 CAPSULE ORAL 3 TIMES DAILY PRN
Status: DISCONTINUED | OUTPATIENT
Start: 2024-01-17 | End: 2024-01-24 | Stop reason: HOSPADM

## 2024-01-17 RX ORDER — PANTOPRAZOLE SODIUM 40 MG/10ML
40 INJECTION, POWDER, LYOPHILIZED, FOR SOLUTION INTRAVENOUS
Status: DISCONTINUED | OUTPATIENT
Start: 2024-01-17 | End: 2024-01-18

## 2024-01-17 RX ORDER — IPRATROPIUM BROMIDE AND ALBUTEROL SULFATE 2.5; .5 MG/3ML; MG/3ML
1 SOLUTION RESPIRATORY (INHALATION) EVERY 4 HOURS PRN
Status: DISCONTINUED | OUTPATIENT
Start: 2024-01-17 | End: 2024-01-24 | Stop reason: HOSPADM

## 2024-01-17 RX ORDER — METOPROLOL TARTRATE 50 MG/1
50 TABLET, FILM COATED ORAL 2 TIMES DAILY
Status: DISCONTINUED | OUTPATIENT
Start: 2024-01-17 | End: 2024-01-22

## 2024-01-17 RX ORDER — SODIUM CHLORIDE 0.9 % (FLUSH) 0.9 %
5-40 SYRINGE (ML) INJECTION EVERY 12 HOURS SCHEDULED
Status: DISCONTINUED | OUTPATIENT
Start: 2024-01-17 | End: 2024-01-24 | Stop reason: HOSPADM

## 2024-01-17 RX ORDER — SODIUM CHLORIDE 9 MG/ML
INJECTION, SOLUTION INTRAVENOUS PRN
Status: DISCONTINUED | OUTPATIENT
Start: 2024-01-17 | End: 2024-01-24 | Stop reason: HOSPADM

## 2024-01-17 RX ORDER — ACETAMINOPHEN 325 MG/1
650 TABLET ORAL EVERY 6 HOURS PRN
Status: DISCONTINUED | OUTPATIENT
Start: 2024-01-17 | End: 2024-01-24 | Stop reason: HOSPADM

## 2024-01-17 RX ORDER — LIDOCAINE 4 G/G
1 PATCH TOPICAL DAILY
Status: DISCONTINUED | OUTPATIENT
Start: 2024-01-17 | End: 2024-01-24 | Stop reason: HOSPADM

## 2024-01-17 RX ADMIN — DOXYCYCLINE HYCLATE 100 MG: 100 CAPSULE ORAL at 13:08

## 2024-01-17 RX ADMIN — SALINE NASAL SPRAY 1 SPRAY: 1.5 SOLUTION NASAL at 16:57

## 2024-01-17 RX ADMIN — METOPROLOL TARTRATE 50 MG: 50 TABLET, FILM COATED ORAL at 16:52

## 2024-01-17 RX ADMIN — DOXYCYCLINE HYCLATE 100 MG: 100 CAPSULE ORAL at 22:25

## 2024-01-17 RX ADMIN — CEFTRIAXONE SODIUM 1000 MG: 1 INJECTION, POWDER, FOR SOLUTION INTRAMUSCULAR; INTRAVENOUS at 22:24

## 2024-01-17 RX ADMIN — CHLORTHALIDONE 25 MG: 25 TABLET ORAL at 13:19

## 2024-01-17 RX ADMIN — GUAIFENESIN 600 MG: 600 TABLET, EXTENDED RELEASE ORAL at 13:08

## 2024-01-17 RX ADMIN — LISINOPRIL 20 MG: 10 TABLET ORAL at 16:52

## 2024-01-17 RX ADMIN — PANTOPRAZOLE SODIUM 40 MG: 40 INJECTION, POWDER, FOR SOLUTION INTRAVENOUS at 16:53

## 2024-01-17 RX ADMIN — SODIUM CHLORIDE, PRESERVATIVE FREE 10 ML: 5 INJECTION INTRAVENOUS at 22:27

## 2024-01-17 RX ADMIN — PRAVASTATIN SODIUM 40 MG: 40 TABLET ORAL at 22:25

## 2024-01-17 RX ADMIN — ISOSORBIDE MONONITRATE 30 MG: 30 TABLET, EXTENDED RELEASE ORAL at 13:19

## 2024-01-17 RX ADMIN — GUAIFENESIN 600 MG: 600 TABLET, EXTENDED RELEASE ORAL at 22:25

## 2024-01-17 NOTE — OR NURSING
Patient tolerated Right Thoracentesis  600 mL thin yellow fluid drained  Labs sent  Post XR obtained  Returned to ED 3

## 2024-01-17 NOTE — H&P
Hospitalist History & Physical Notes.           [facility].              Name : Melania Alvarenga      MRN number : [id number]     YOB: 1933     Subjective :   Chief Complaint : Shortness of breath, back pain 10 on scale of 10, dyspnea    Source of information : Patient, reviewed previous records.  Discussed with the emergency room physician.    History of present illness:   Melania Alvarenga is  89 y.o. female with below mentioned past medical history lives alone at home independent of ADLs presents to the emergency room due to increased shortness of breath.  Started having congestive cough 10 days ago with shortness of breath getting worse in intensity.  This morning she woke up and trying to eat her breakfast and unable to finish due to shortness of breath.  Since then she continued to do get worse so presented to the emergency room.  She cannot recall any exposure to any sick contacts.  No fever or chills.  Denies any nausea or vomiting.  Admits nasal congestion and runny nose most of the time.  She also complains of severe back pain rating 10 on scale of 10 with recent fall few days ago.  She did not have any loss of consciousness or head injury.    Complains of shortness of breath worse with activity which is worse from her baseline.  Denies any swallowing difficulty.  Denies any bleeding per rectum but complains of pain in the rectum with ulcers occasionally.    She is on anticoagulation with Xarelto think it is secondary to carotid artery stents.  But on the monitor rate controlled atrial fibrillation and with history of atrial fibrillation.  Denies any cardiac history but she is seeing cardiologist regularly but think it is from something else.    She does have a history of anemia but does not know the status of it, takes B12 injections every month.  No previous labs are available at this time.  Found with a hemoglobin of 6.4 with microcytic picture.    Chest x-ray and CT scan

## 2024-01-17 NOTE — ED NOTES
ED TO INPATIENT SBAR HANDOFF    Patient Name: Melania Alvarenga   Preferred Name: Melania  : 1933  89 y.o.   Family/Caregiver Present: no   Code Status Order: Full Code  PO Status: regular  Telemetry Order:   C-SSRS: Risk of Suicide: No Risk  Sitter no   Restraints:     Sepsis Risk Score Sepsis Risk Score: 2.53    Situation  Chief Complaint   Patient presents with    Back Pain    Shortness of Breath     Brief Description of Patient's Condition:Per EMS, pt had previous back pain that was rated 10/10, going on for months prior to arrival. Pt was 88% in 2L NC by EMS and was brought up to 96%. . Pt is hypertensive 210/145 per ems, hx of hypertension.Pt denies any falls recently, states managing her meds at home.    Mental Status: oriented, alert, coherent, logical, thought processes intact, and able to concentrate and follow conversation  Arrived from:Home  Imaging:   CT ABDOMEN PELVIS WO CONTRAST Additional Contrast? None         CTA CHEST W WO CONTRAST   Final Result   Age-indeterminate thoracic compression fractures   Bilateral pleural exudates, moderate on the right and small on the left   Dilated cardiomyopathy   No pulmonary embolism         XR CHEST PORTABLE   Final Result   Cardiomegaly with bibasilar atelectasis and a small right pleural   effusion.      IR GUIDED THORACENTESIS PLEURAL    (Results Pending)   XR CHEST PORTABLE    (Results Pending)     Abnormal labs:   Abnormal Labs Reviewed   BRAIN NATRIURETIC PEPTIDE - Abnormal; Notable for the following components:       Result Value    NT Pro-BNP 4,759 (*)     All other components within normal limits   COMPREHENSIVE METABOLIC PANEL - Abnormal; Notable for the following components:    Glucose 116 (*)     BUN 22 (*)     Bun/Cre Ratio 29 (*)     AST 14 (*)     All other components within normal limits   CBC WITH AUTO DIFFERENTIAL - Abnormal; Notable for the following components:    Hemoglobin 6.4 (*)     Hematocrit 27.2 (*)     MCV 65.4 (*)     MCH 15.4 (*)

## 2024-01-18 LAB
ALBUMIN SERPL-MCNC: 3.2 G/DL (ref 3.5–5)
ANION GAP SERPL CALC-SCNC: 0 MMOL/L (ref 5–15)
BASOPHILS # BLD: 0.1 K/UL (ref 0–0.1)
BASOPHILS NFR BLD: 1 % (ref 0–1)
BNP SERPL-MCNC: 2359 PG/ML
BUN SERPL-MCNC: 14 MG/DL (ref 6–20)
BUN/CREAT SERPL: 24 (ref 12–20)
CA-I BLD-MCNC: 8.8 MG/DL (ref 8.5–10.1)
CHLORIDE SERPL-SCNC: 102 MMOL/L (ref 97–108)
CO2 SERPL-SCNC: 35 MMOL/L (ref 21–32)
CREAT SERPL-MCNC: 0.58 MG/DL (ref 0.55–1.02)
CYTOLOGY-NON GYN: NORMAL
DIFFERENTIAL METHOD BLD: ABNORMAL
EKG ATRIAL RATE: 74 BPM
EKG DIAGNOSIS: NORMAL
EKG Q-T INTERVAL: 440 MS
EKG QRS DURATION: 98 MS
EKG QTC CALCULATION (BAZETT): 453 MS
EKG R AXIS: 56 DEGREES
EKG T AXIS: 173 DEGREES
EKG VENTRICULAR RATE: 64 BPM
EOSINOPHIL # BLD: 0.3 K/UL (ref 0–0.4)
EOSINOPHIL NFR BLD: 5 % (ref 0–7)
ERYTHROCYTE [DISTWIDTH] IN BLOOD BY AUTOMATED COUNT: 25.1 % (ref 11.5–14.5)
GLUCOSE SERPL-MCNC: 82 MG/DL (ref 65–100)
HCT VFR BLD AUTO: 28.7 % (ref 35–47)
HGB BLD-MCNC: 7.2 G/DL (ref 11.5–16)
IMM GRANULOCYTES # BLD AUTO: 0 K/UL
IMM GRANULOCYTES NFR BLD AUTO: 0 %
LYMPHOCYTES # BLD: 0.9 K/UL (ref 0.8–3.5)
LYMPHOCYTES NFR BLD: 14 % (ref 12–49)
MCH RBC QN AUTO: 17.4 PG (ref 26–34)
MCHC RBC AUTO-ENTMCNC: 25.1 G/DL (ref 30–36.5)
MCV RBC AUTO: 69.3 FL (ref 80–99)
MONOCYTES # BLD: 0.6 K/UL (ref 0–1)
MONOCYTES NFR BLD: 10 % (ref 5–13)
NEUTS SEG # BLD: 4.5 K/UL (ref 1.8–8)
NEUTS SEG NFR BLD: 70 % (ref 32–75)
NRBC # BLD: 0.07 K/UL (ref 0–0.01)
NRBC BLD-RTO: 1.1 PER 100 WBC
PHOSPHATE SERPL-MCNC: 3.9 MG/DL (ref 2.6–4.7)
PLATELET # BLD AUTO: 147 K/UL (ref 150–400)
POTASSIUM SERPL-SCNC: 4 MMOL/L (ref 3.5–5.1)
RBC # BLD AUTO: 4.14 M/UL (ref 3.8–5.2)
RBC MORPH BLD: ABNORMAL
SODIUM SERPL-SCNC: 137 MMOL/L (ref 136–145)
WBC # BLD AUTO: 6.4 K/UL (ref 3.6–11)

## 2024-01-18 PROCEDURE — 93005 ELECTROCARDIOGRAM TRACING: CPT | Performed by: INTERNAL MEDICINE

## 2024-01-18 PROCEDURE — C9113 INJ PANTOPRAZOLE SODIUM, VIA: HCPCS | Performed by: HOSPITALIST

## 2024-01-18 PROCEDURE — 83880 ASSAY OF NATRIURETIC PEPTIDE: CPT

## 2024-01-18 PROCEDURE — 36415 COLL VENOUS BLD VENIPUNCTURE: CPT

## 2024-01-18 PROCEDURE — 2580000003 HC RX 258: Performed by: HOSPITALIST

## 2024-01-18 PROCEDURE — 85025 COMPLETE CBC W/AUTO DIFF WBC: CPT

## 2024-01-18 PROCEDURE — 6370000000 HC RX 637 (ALT 250 FOR IP): Performed by: HOSPITALIST

## 2024-01-18 PROCEDURE — 1100000000 HC RM PRIVATE

## 2024-01-18 PROCEDURE — 80069 RENAL FUNCTION PANEL: CPT

## 2024-01-18 PROCEDURE — 6360000002 HC RX W HCPCS

## 2024-01-18 PROCEDURE — 6360000002 HC RX W HCPCS: Performed by: HOSPITALIST

## 2024-01-18 RX ORDER — PANTOPRAZOLE SODIUM 40 MG/1
40 TABLET, DELAYED RELEASE ORAL
Status: DISCONTINUED | OUTPATIENT
Start: 2024-01-19 | End: 2024-01-24 | Stop reason: HOSPADM

## 2024-01-18 RX ORDER — AMOXICILLIN AND CLAVULANATE POTASSIUM 875; 125 MG/1; MG/1
1 TABLET, FILM COATED ORAL EVERY 12 HOURS SCHEDULED
Status: DISCONTINUED | OUTPATIENT
Start: 2024-01-18 | End: 2024-01-24 | Stop reason: HOSPADM

## 2024-01-18 RX ORDER — FUROSEMIDE 10 MG/ML
40 INJECTION INTRAMUSCULAR; INTRAVENOUS DAILY
Status: DISCONTINUED | OUTPATIENT
Start: 2024-01-18 | End: 2024-01-22

## 2024-01-18 RX ADMIN — AMOXICILLIN AND CLAVULANATE POTASSIUM 1 TABLET: 875; 125 TABLET, FILM COATED ORAL at 20:54

## 2024-01-18 RX ADMIN — METOPROLOL TARTRATE 50 MG: 50 TABLET, FILM COATED ORAL at 09:07

## 2024-01-18 RX ADMIN — ISOSORBIDE MONONITRATE 30 MG: 30 TABLET, EXTENDED RELEASE ORAL at 09:07

## 2024-01-18 RX ADMIN — GUAIFENESIN 600 MG: 600 TABLET, EXTENDED RELEASE ORAL at 09:07

## 2024-01-18 RX ADMIN — DOXYCYCLINE HYCLATE 100 MG: 100 CAPSULE ORAL at 20:54

## 2024-01-18 RX ADMIN — FUROSEMIDE 40 MG: 10 INJECTION, SOLUTION INTRAMUSCULAR; INTRAVENOUS at 16:17

## 2024-01-18 RX ADMIN — CHLORTHALIDONE 25 MG: 25 TABLET ORAL at 09:07

## 2024-01-18 RX ADMIN — GUAIFENESIN 600 MG: 600 TABLET, EXTENDED RELEASE ORAL at 20:53

## 2024-01-18 RX ADMIN — PRAVASTATIN SODIUM 40 MG: 40 TABLET ORAL at 20:53

## 2024-01-18 RX ADMIN — PANTOPRAZOLE SODIUM 40 MG: 40 INJECTION, POWDER, FOR SOLUTION INTRAVENOUS at 06:03

## 2024-01-18 RX ADMIN — SODIUM CHLORIDE, PRESERVATIVE FREE 10 ML: 5 INJECTION INTRAVENOUS at 20:54

## 2024-01-18 RX ADMIN — DOXYCYCLINE HYCLATE 100 MG: 100 CAPSULE ORAL at 09:07

## 2024-01-18 RX ADMIN — LISINOPRIL 20 MG: 10 TABLET ORAL at 09:07

## 2024-01-18 RX ADMIN — SODIUM CHLORIDE, PRESERVATIVE FREE 10 ML: 5 INJECTION INTRAVENOUS at 09:07

## 2024-01-19 ENCOUNTER — APPOINTMENT (OUTPATIENT)
Facility: HOSPITAL | Age: 89
DRG: 291 | End: 2024-01-19
Payer: MEDICARE

## 2024-01-19 LAB
ALBUMIN SERPL-MCNC: 3.2 G/DL (ref 3.5–5)
ANION GAP SERPL CALC-SCNC: 4 MMOL/L (ref 5–15)
BASOPHILS # BLD: 0.1 K/UL (ref 0–0.1)
BASOPHILS NFR BLD: 1 % (ref 0–1)
BUN SERPL-MCNC: 16 MG/DL (ref 6–20)
BUN/CREAT SERPL: 24 (ref 12–20)
CA-I BLD-MCNC: 8.9 MG/DL (ref 8.5–10.1)
CHLORIDE SERPL-SCNC: 94 MMOL/L (ref 97–108)
CO2 SERPL-SCNC: 38 MMOL/L (ref 21–32)
CREAT SERPL-MCNC: 0.67 MG/DL (ref 0.55–1.02)
DIFFERENTIAL METHOD BLD: ABNORMAL
EKG ATRIAL RATE: 117 BPM
EKG DIAGNOSIS: NORMAL
EKG Q-T INTERVAL: 396 MS
EKG QRS DURATION: 100 MS
EKG QTC CALCULATION (BAZETT): 467 MS
EKG R AXIS: 53 DEGREES
EKG T AXIS: 193 DEGREES
EKG VENTRICULAR RATE: 84 BPM
EOSINOPHIL # BLD: 0.3 K/UL (ref 0–0.4)
EOSINOPHIL NFR BLD: 3 % (ref 0–7)
ERYTHROCYTE [DISTWIDTH] IN BLOOD BY AUTOMATED COUNT: 24.9 % (ref 11.5–14.5)
GLUCOSE SERPL-MCNC: 91 MG/DL (ref 65–100)
HCT VFR BLD AUTO: 29.7 % (ref 35–47)
HGB BLD-MCNC: 7.5 G/DL (ref 11.5–16)
IMM GRANULOCYTES # BLD AUTO: 0 K/UL (ref 0–0.04)
IMM GRANULOCYTES NFR BLD AUTO: 0 % (ref 0–0.5)
LYMPHOCYTES # BLD: 1 K/UL (ref 0.8–3.5)
LYMPHOCYTES NFR BLD: 10 % (ref 12–49)
MCH RBC QN AUTO: 17.1 PG (ref 26–34)
MCHC RBC AUTO-ENTMCNC: 25.3 G/DL (ref 30–36.5)
MCV RBC AUTO: 67.8 FL (ref 80–99)
MONOCYTES # BLD: 1.2 K/UL (ref 0–1)
MONOCYTES NFR BLD: 12 % (ref 5–13)
NEUTS SEG # BLD: 7 K/UL (ref 1.8–8)
NEUTS SEG NFR BLD: 74 % (ref 32–75)
NRBC # BLD: 0.02 K/UL (ref 0–0.01)
NRBC BLD-RTO: 0.2 PER 100 WBC
PHOSPHATE SERPL-MCNC: 4.1 MG/DL (ref 2.6–4.7)
PLATELET # BLD AUTO: 165 K/UL (ref 150–400)
POTASSIUM SERPL-SCNC: 3.6 MMOL/L (ref 3.5–5.1)
RBC # BLD AUTO: 4.38 M/UL (ref 3.8–5.2)
RBC MORPH BLD: ABNORMAL
SODIUM SERPL-SCNC: 136 MMOL/L (ref 136–145)
WBC # BLD AUTO: 9.6 K/UL (ref 3.6–11)

## 2024-01-19 PROCEDURE — 72146 MRI CHEST SPINE W/O DYE: CPT

## 2024-01-19 PROCEDURE — 80069 RENAL FUNCTION PANEL: CPT

## 2024-01-19 PROCEDURE — 72148 MRI LUMBAR SPINE W/O DYE: CPT

## 2024-01-19 PROCEDURE — 6360000002 HC RX W HCPCS

## 2024-01-19 PROCEDURE — 6370000000 HC RX 637 (ALT 250 FOR IP): Performed by: HOSPITALIST

## 2024-01-19 PROCEDURE — 2580000003 HC RX 258: Performed by: HOSPITALIST

## 2024-01-19 PROCEDURE — 2700000000 HC OXYGEN THERAPY PER DAY

## 2024-01-19 PROCEDURE — 6370000000 HC RX 637 (ALT 250 FOR IP): Performed by: INTERNAL MEDICINE

## 2024-01-19 PROCEDURE — 94761 N-INVAS EAR/PLS OXIMETRY MLT: CPT

## 2024-01-19 PROCEDURE — 1100000000 HC RM PRIVATE

## 2024-01-19 PROCEDURE — 85025 COMPLETE CBC W/AUTO DIFF WBC: CPT

## 2024-01-19 PROCEDURE — 93005 ELECTROCARDIOGRAM TRACING: CPT | Performed by: INTERNAL MEDICINE

## 2024-01-19 PROCEDURE — 6370000000 HC RX 637 (ALT 250 FOR IP): Performed by: STUDENT IN AN ORGANIZED HEALTH CARE EDUCATION/TRAINING PROGRAM

## 2024-01-19 PROCEDURE — 36415 COLL VENOUS BLD VENIPUNCTURE: CPT

## 2024-01-19 RX ORDER — FERROUS SULFATE 325(65) MG
325 TABLET ORAL 2 TIMES DAILY WITH MEALS
Status: DISCONTINUED | OUTPATIENT
Start: 2024-01-19 | End: 2024-01-24 | Stop reason: HOSPADM

## 2024-01-19 RX ADMIN — ISOSORBIDE MONONITRATE 30 MG: 30 TABLET, EXTENDED RELEASE ORAL at 08:09

## 2024-01-19 RX ADMIN — CHLORTHALIDONE 25 MG: 25 TABLET ORAL at 08:09

## 2024-01-19 RX ADMIN — DOXYCYCLINE HYCLATE 100 MG: 100 CAPSULE ORAL at 08:10

## 2024-01-19 RX ADMIN — METOPROLOL TARTRATE 50 MG: 50 TABLET, FILM COATED ORAL at 20:55

## 2024-01-19 RX ADMIN — SODIUM CHLORIDE, PRESERVATIVE FREE 10 ML: 5 INJECTION INTRAVENOUS at 20:56

## 2024-01-19 RX ADMIN — DOXYCYCLINE HYCLATE 100 MG: 100 CAPSULE ORAL at 20:54

## 2024-01-19 RX ADMIN — FERROUS SULFATE TAB 325 MG (65 MG ELEMENTAL FE) 325 MG: 325 (65 FE) TAB at 15:33

## 2024-01-19 RX ADMIN — LISINOPRIL 20 MG: 10 TABLET ORAL at 08:10

## 2024-01-19 RX ADMIN — FUROSEMIDE 40 MG: 10 INJECTION, SOLUTION INTRAMUSCULAR; INTRAVENOUS at 08:09

## 2024-01-19 RX ADMIN — GUAIFENESIN 600 MG: 600 TABLET, EXTENDED RELEASE ORAL at 20:54

## 2024-01-19 RX ADMIN — SODIUM CHLORIDE, PRESERVATIVE FREE 10 ML: 5 INJECTION INTRAVENOUS at 08:10

## 2024-01-19 RX ADMIN — METOPROLOL TARTRATE 50 MG: 50 TABLET, FILM COATED ORAL at 08:09

## 2024-01-19 RX ADMIN — PANTOPRAZOLE SODIUM 40 MG: 40 TABLET, DELAYED RELEASE ORAL at 06:15

## 2024-01-19 RX ADMIN — AMOXICILLIN AND CLAVULANATE POTASSIUM 1 TABLET: 875; 125 TABLET, FILM COATED ORAL at 08:09

## 2024-01-19 RX ADMIN — AMOXICILLIN AND CLAVULANATE POTASSIUM 1 TABLET: 875; 125 TABLET, FILM COATED ORAL at 20:55

## 2024-01-19 RX ADMIN — PRAVASTATIN SODIUM 40 MG: 40 TABLET ORAL at 20:55

## 2024-01-19 RX ADMIN — GUAIFENESIN 600 MG: 600 TABLET, EXTENDED RELEASE ORAL at 08:10

## 2024-01-20 LAB
ALBUMIN SERPL-MCNC: 3 G/DL (ref 3.5–5)
ANION GAP SERPL CALC-SCNC: 5 MMOL/L (ref 5–15)
BASOPHILS # BLD: 0.1 K/UL (ref 0–0.1)
BASOPHILS NFR BLD: 1 % (ref 0–1)
BNP SERPL-MCNC: 1492 PG/ML
BUN SERPL-MCNC: 20 MG/DL (ref 6–20)
BUN/CREAT SERPL: 26 (ref 12–20)
CA-I BLD-MCNC: 8.6 MG/DL (ref 8.5–10.1)
CHLORIDE SERPL-SCNC: 91 MMOL/L (ref 97–108)
CO2 SERPL-SCNC: 41 MMOL/L (ref 21–32)
CREAT SERPL-MCNC: 0.76 MG/DL (ref 0.55–1.02)
DIFFERENTIAL METHOD BLD: ABNORMAL
EKG ATRIAL RATE: 65 BPM
EKG DIAGNOSIS: NORMAL
EKG Q-T INTERVAL: 468 MS
EKG QRS DURATION: 110 MS
EKG QTC CALCULATION (BAZETT): 455 MS
EKG R AXIS: 18 DEGREES
EKG T AXIS: -89 DEGREES
EKG VENTRICULAR RATE: 57 BPM
EOSINOPHIL # BLD: 0.3 K/UL (ref 0–0.4)
EOSINOPHIL NFR BLD: 4 % (ref 0–7)
ERYTHROCYTE [DISTWIDTH] IN BLOOD BY AUTOMATED COUNT: 25.5 % (ref 11.5–14.5)
GLUCOSE SERPL-MCNC: 92 MG/DL (ref 65–100)
HCT VFR BLD AUTO: 30.7 % (ref 35–47)
HGB BLD-MCNC: 7.9 G/DL (ref 11.5–16)
IMM GRANULOCYTES # BLD AUTO: 0 K/UL
IMM GRANULOCYTES NFR BLD AUTO: 0 %
LYMPHOCYTES # BLD: 0.9 K/UL (ref 0.8–3.5)
LYMPHOCYTES NFR BLD: 13 % (ref 12–49)
MCH RBC QN AUTO: 17.1 PG (ref 26–34)
MCHC RBC AUTO-ENTMCNC: 25.7 G/DL (ref 30–36.5)
MCV RBC AUTO: 66.6 FL (ref 80–99)
MONOCYTES # BLD: 0.9 K/UL (ref 0–1)
MONOCYTES NFR BLD: 14 % (ref 5–13)
NEUTS SEG # BLD: 4.4 K/UL (ref 1.8–8)
NEUTS SEG NFR BLD: 68 % (ref 32–75)
NRBC # BLD: 0 K/UL (ref 0–0.01)
NRBC BLD-RTO: 0 PER 100 WBC
PHOSPHATE SERPL-MCNC: 3.8 MG/DL (ref 2.6–4.7)
PLATELET # BLD AUTO: 175 K/UL (ref 150–400)
POTASSIUM SERPL-SCNC: 3.2 MMOL/L (ref 3.5–5.1)
RBC # BLD AUTO: 4.61 M/UL (ref 3.8–5.2)
RBC MORPH BLD: ABNORMAL
SODIUM SERPL-SCNC: 137 MMOL/L (ref 136–145)
WBC # BLD AUTO: 6.6 K/UL (ref 3.6–11)

## 2024-01-20 PROCEDURE — 6370000000 HC RX 637 (ALT 250 FOR IP): Performed by: STUDENT IN AN ORGANIZED HEALTH CARE EDUCATION/TRAINING PROGRAM

## 2024-01-20 PROCEDURE — 6370000000 HC RX 637 (ALT 250 FOR IP): Performed by: HOSPITALIST

## 2024-01-20 PROCEDURE — 6370000000 HC RX 637 (ALT 250 FOR IP): Performed by: INTERNAL MEDICINE

## 2024-01-20 PROCEDURE — 2580000003 HC RX 258: Performed by: HOSPITALIST

## 2024-01-20 PROCEDURE — 1100000000 HC RM PRIVATE

## 2024-01-20 PROCEDURE — 83880 ASSAY OF NATRIURETIC PEPTIDE: CPT

## 2024-01-20 PROCEDURE — 80069 RENAL FUNCTION PANEL: CPT

## 2024-01-20 PROCEDURE — 6360000002 HC RX W HCPCS

## 2024-01-20 PROCEDURE — 85025 COMPLETE CBC W/AUTO DIFF WBC: CPT

## 2024-01-20 PROCEDURE — 93005 ELECTROCARDIOGRAM TRACING: CPT | Performed by: INTERNAL MEDICINE

## 2024-01-20 PROCEDURE — 36415 COLL VENOUS BLD VENIPUNCTURE: CPT

## 2024-01-20 RX ORDER — POTASSIUM CHLORIDE 20 MEQ/1
40 TABLET, EXTENDED RELEASE ORAL ONCE
Status: DISCONTINUED | OUTPATIENT
Start: 2024-01-20 | End: 2024-01-20

## 2024-01-20 RX ORDER — POTASSIUM CHLORIDE 20 MEQ/1
40 TABLET, EXTENDED RELEASE ORAL 2 TIMES DAILY
Status: COMPLETED | OUTPATIENT
Start: 2024-01-20 | End: 2024-01-21

## 2024-01-20 RX ADMIN — PANTOPRAZOLE SODIUM 40 MG: 40 TABLET, DELAYED RELEASE ORAL at 06:12

## 2024-01-20 RX ADMIN — AMOXICILLIN AND CLAVULANATE POTASSIUM 1 TABLET: 875; 125 TABLET, FILM COATED ORAL at 21:18

## 2024-01-20 RX ADMIN — CHLORTHALIDONE 25 MG: 25 TABLET ORAL at 13:01

## 2024-01-20 RX ADMIN — LISINOPRIL 20 MG: 10 TABLET ORAL at 13:01

## 2024-01-20 RX ADMIN — FERROUS SULFATE TAB 325 MG (65 MG ELEMENTAL FE) 325 MG: 325 (65 FE) TAB at 08:48

## 2024-01-20 RX ADMIN — POTASSIUM CHLORIDE 40 MEQ: 1500 TABLET, EXTENDED RELEASE ORAL at 21:18

## 2024-01-20 RX ADMIN — DOXYCYCLINE HYCLATE 100 MG: 100 CAPSULE ORAL at 08:48

## 2024-01-20 RX ADMIN — GUAIFENESIN 600 MG: 600 TABLET, EXTENDED RELEASE ORAL at 08:48

## 2024-01-20 RX ADMIN — SODIUM CHLORIDE, PRESERVATIVE FREE 10 ML: 5 INJECTION INTRAVENOUS at 21:19

## 2024-01-20 RX ADMIN — SODIUM CHLORIDE, PRESERVATIVE FREE 10 ML: 5 INJECTION INTRAVENOUS at 08:49

## 2024-01-20 RX ADMIN — FUROSEMIDE 40 MG: 10 INJECTION, SOLUTION INTRAMUSCULAR; INTRAVENOUS at 08:46

## 2024-01-20 RX ADMIN — METOPROLOL TARTRATE 50 MG: 50 TABLET, FILM COATED ORAL at 08:48

## 2024-01-20 RX ADMIN — DOXYCYCLINE HYCLATE 100 MG: 100 CAPSULE ORAL at 21:18

## 2024-01-20 RX ADMIN — PRAVASTATIN SODIUM 40 MG: 40 TABLET ORAL at 21:18

## 2024-01-20 RX ADMIN — AMOXICILLIN AND CLAVULANATE POTASSIUM 1 TABLET: 875; 125 TABLET, FILM COATED ORAL at 08:48

## 2024-01-20 RX ADMIN — FERROUS SULFATE TAB 325 MG (65 MG ELEMENTAL FE) 325 MG: 325 (65 FE) TAB at 18:56

## 2024-01-20 RX ADMIN — GUAIFENESIN 600 MG: 600 TABLET, EXTENDED RELEASE ORAL at 21:18

## 2024-01-20 RX ADMIN — ISOSORBIDE MONONITRATE 30 MG: 30 TABLET, EXTENDED RELEASE ORAL at 13:01

## 2024-01-21 LAB
ALBUMIN SERPL-MCNC: 3.2 G/DL (ref 3.5–5)
ANION GAP SERPL CALC-SCNC: 4 MMOL/L (ref 5–15)
ARTERIAL PATENCY WRIST A: YES
BACTERIA SPEC CULT: NORMAL
BASE EXCESS BLDA CALC-SCNC: 17.5 MMOL/L (ref 0–3)
BASOPHILS # BLD: 0.1 K/UL (ref 0–0.1)
BASOPHILS NFR BLD: 1 % (ref 0–1)
BDY SITE: ABNORMAL
BODY TEMPERATURE: 98
BUN SERPL-MCNC: 25 MG/DL (ref 6–20)
BUN/CREAT SERPL: 34 (ref 12–20)
CA-I BLD-MCNC: 9 MG/DL (ref 8.5–10.1)
CHLORIDE SERPL-SCNC: 90 MMOL/L (ref 97–108)
CO2 SERPL-SCNC: 41 MMOL/L (ref 21–32)
COHGB MFR BLD: 1.2 % (ref 1–2)
CREAT SERPL-MCNC: 0.74 MG/DL (ref 0.55–1.02)
DIFFERENTIAL METHOD BLD: ABNORMAL
EOSINOPHIL # BLD: 0.2 K/UL (ref 0–0.4)
EOSINOPHIL NFR BLD: 3 % (ref 0–7)
ERYTHROCYTE [DISTWIDTH] IN BLOOD BY AUTOMATED COUNT: 25.7 % (ref 11.5–14.5)
FIO2 ON VENT: 28 %
GLUCOSE SERPL-MCNC: 96 MG/DL (ref 65–100)
GRAM STN SPEC: NORMAL
GRAM STN SPEC: NORMAL
HCO3 BLDA-SCNC: 45 MMOL/L (ref 22–26)
HCT VFR BLD AUTO: 30.4 % (ref 35–47)
HGB BLD-MCNC: 7.9 G/DL (ref 11.5–16)
IMM GRANULOCYTES # BLD AUTO: 0 K/UL
IMM GRANULOCYTES NFR BLD AUTO: 0 %
LYMPHOCYTES # BLD: 1 K/UL (ref 0.8–3.5)
LYMPHOCYTES NFR BLD: 14 % (ref 12–49)
Lab: NORMAL
MCH RBC QN AUTO: 17.5 PG (ref 26–34)
MCHC RBC AUTO-ENTMCNC: 26 G/DL (ref 30–36.5)
MCV RBC AUTO: 67.4 FL (ref 80–99)
METHGB MFR BLD: 0.3 % (ref 0–1.4)
MONOCYTES # BLD: 0.8 K/UL (ref 0–1)
MONOCYTES NFR BLD: 12 % (ref 5–13)
NEUTS SEG # BLD: 4.8 K/UL (ref 1.8–8)
NEUTS SEG NFR BLD: 70 % (ref 32–75)
NRBC # BLD: 0 K/UL (ref 0–0.01)
NRBC BLD-RTO: 0 PER 100 WBC
OXYHGB MFR BLD: 95.4 % (ref 95–99)
PCO2 BLDA: 70 MMHG (ref 35–45)
PERFORMED BY:: ABNORMAL
PH BLDA: 7.42 (ref 7.35–7.45)
PHOSPHATE SERPL-MCNC: 4.1 MG/DL (ref 2.6–4.7)
PLATELET # BLD AUTO: 171 K/UL (ref 150–400)
PO2 BLDA: 91 MMHG (ref 80–100)
POTASSIUM SERPL-SCNC: 3.8 MMOL/L (ref 3.5–5.1)
RBC # BLD AUTO: 4.51 M/UL (ref 3.8–5.2)
RBC MORPH BLD: ABNORMAL
SAO2 % BLD: 97 % (ref 95–99)
SAO2% DEVICE SAO2% SENSOR NAME: ABNORMAL
SODIUM SERPL-SCNC: 135 MMOL/L (ref 136–145)
SPECIMEN SITE: ABNORMAL
WBC # BLD AUTO: 6.9 K/UL (ref 3.6–11)

## 2024-01-21 PROCEDURE — 6370000000 HC RX 637 (ALT 250 FOR IP): Performed by: INTERNAL MEDICINE

## 2024-01-21 PROCEDURE — 6370000000 HC RX 637 (ALT 250 FOR IP): Performed by: STUDENT IN AN ORGANIZED HEALTH CARE EDUCATION/TRAINING PROGRAM

## 2024-01-21 PROCEDURE — 6370000000 HC RX 637 (ALT 250 FOR IP): Performed by: HOSPITALIST

## 2024-01-21 PROCEDURE — 6360000002 HC RX W HCPCS: Performed by: STUDENT IN AN ORGANIZED HEALTH CARE EDUCATION/TRAINING PROGRAM

## 2024-01-21 PROCEDURE — 36600 WITHDRAWAL OF ARTERIAL BLOOD: CPT

## 2024-01-21 PROCEDURE — 82803 BLOOD GASES ANY COMBINATION: CPT

## 2024-01-21 PROCEDURE — 36415 COLL VENOUS BLD VENIPUNCTURE: CPT

## 2024-01-21 PROCEDURE — 92526 ORAL FUNCTION THERAPY: CPT

## 2024-01-21 PROCEDURE — 85025 COMPLETE CBC W/AUTO DIFF WBC: CPT

## 2024-01-21 PROCEDURE — 1100000000 HC RM PRIVATE

## 2024-01-21 PROCEDURE — 2580000003 HC RX 258: Performed by: HOSPITALIST

## 2024-01-21 PROCEDURE — 6360000002 HC RX W HCPCS

## 2024-01-21 PROCEDURE — 80069 RENAL FUNCTION PANEL: CPT

## 2024-01-21 RX ORDER — DOCUSATE SODIUM 100 MG/1
100 CAPSULE, LIQUID FILLED ORAL DAILY
Status: DISCONTINUED | OUTPATIENT
Start: 2024-01-21 | End: 2024-01-24 | Stop reason: HOSPADM

## 2024-01-21 RX ORDER — ACETAZOLAMIDE 500 MG/5ML
500 INJECTION, POWDER, LYOPHILIZED, FOR SOLUTION INTRAVENOUS EVERY 12 HOURS
Status: DISPENSED | OUTPATIENT
Start: 2024-01-21 | End: 2024-01-22

## 2024-01-21 RX ADMIN — DOCUSATE SODIUM 100 MG: 100 CAPSULE, LIQUID FILLED ORAL at 18:50

## 2024-01-21 RX ADMIN — ACETAZOLAMIDE 500 MG: 500 INJECTION, POWDER, LYOPHILIZED, FOR SOLUTION INTRAVENOUS at 11:31

## 2024-01-21 RX ADMIN — PANTOPRAZOLE SODIUM 40 MG: 40 TABLET, DELAYED RELEASE ORAL at 05:41

## 2024-01-21 RX ADMIN — FERROUS SULFATE TAB 325 MG (65 MG ELEMENTAL FE) 325 MG: 325 (65 FE) TAB at 18:50

## 2024-01-21 RX ADMIN — SODIUM CHLORIDE, PRESERVATIVE FREE 10 ML: 5 INJECTION INTRAVENOUS at 20:13

## 2024-01-21 RX ADMIN — FUROSEMIDE 40 MG: 10 INJECTION, SOLUTION INTRAMUSCULAR; INTRAVENOUS at 08:40

## 2024-01-21 RX ADMIN — PRAVASTATIN SODIUM 40 MG: 40 TABLET ORAL at 20:12

## 2024-01-21 RX ADMIN — POTASSIUM CHLORIDE 40 MEQ: 1500 TABLET, EXTENDED RELEASE ORAL at 08:35

## 2024-01-21 RX ADMIN — AMOXICILLIN AND CLAVULANATE POTASSIUM 1 TABLET: 875; 125 TABLET, FILM COATED ORAL at 20:12

## 2024-01-21 RX ADMIN — DOXYCYCLINE HYCLATE 100 MG: 100 CAPSULE ORAL at 20:12

## 2024-01-21 RX ADMIN — GUAIFENESIN 600 MG: 600 TABLET, EXTENDED RELEASE ORAL at 20:13

## 2024-01-21 RX ADMIN — GUAIFENESIN 600 MG: 600 TABLET, EXTENDED RELEASE ORAL at 08:35

## 2024-01-21 RX ADMIN — FERROUS SULFATE TAB 325 MG (65 MG ELEMENTAL FE) 325 MG: 325 (65 FE) TAB at 08:35

## 2024-01-21 RX ADMIN — SODIUM CHLORIDE, PRESERVATIVE FREE 10 ML: 5 INJECTION INTRAVENOUS at 08:43

## 2024-01-21 RX ADMIN — METOPROLOL TARTRATE 50 MG: 50 TABLET, FILM COATED ORAL at 08:40

## 2024-01-21 RX ADMIN — METOPROLOL TARTRATE 50 MG: 50 TABLET, FILM COATED ORAL at 20:12

## 2024-01-21 RX ADMIN — CHLORTHALIDONE 25 MG: 25 TABLET ORAL at 08:40

## 2024-01-21 RX ADMIN — DOXYCYCLINE HYCLATE 100 MG: 100 CAPSULE ORAL at 08:36

## 2024-01-21 RX ADMIN — LISINOPRIL 20 MG: 10 TABLET ORAL at 08:40

## 2024-01-21 RX ADMIN — ISOSORBIDE MONONITRATE 30 MG: 30 TABLET, EXTENDED RELEASE ORAL at 08:40

## 2024-01-21 RX ADMIN — AMOXICILLIN AND CLAVULANATE POTASSIUM 1 TABLET: 875; 125 TABLET, FILM COATED ORAL at 08:35

## 2024-01-22 ENCOUNTER — APPOINTMENT (OUTPATIENT)
Facility: HOSPITAL | Age: 89
DRG: 291 | End: 2024-01-22
Payer: MEDICARE

## 2024-01-22 LAB
ANION GAP SERPL CALC-SCNC: 2 MMOL/L (ref 5–15)
BASOPHILS # BLD: 0.1 K/UL (ref 0–0.1)
BASOPHILS NFR BLD: 1 % (ref 0–1)
BUN SERPL-MCNC: 34 MG/DL (ref 6–20)
BUN/CREAT SERPL: 34 (ref 12–20)
CA-I BLD-MCNC: 9.5 MG/DL (ref 8.5–10.1)
CHLORIDE SERPL-SCNC: 91 MMOL/L (ref 97–108)
CO2 SERPL-SCNC: 41 MMOL/L (ref 21–32)
CREAT SERPL-MCNC: 1.01 MG/DL (ref 0.55–1.02)
DIFFERENTIAL METHOD BLD: ABNORMAL
EOSINOPHIL # BLD: 0.4 K/UL (ref 0–0.4)
EOSINOPHIL NFR BLD: 5 % (ref 0–7)
ERYTHROCYTE [DISTWIDTH] IN BLOOD BY AUTOMATED COUNT: 26.7 % (ref 11.5–14.5)
GLUCOSE SERPL-MCNC: 89 MG/DL (ref 65–100)
HCT VFR BLD AUTO: 35.4 % (ref 35–47)
HGB BLD-MCNC: 9.1 G/DL (ref 11.5–16)
IMM GRANULOCYTES # BLD AUTO: 0 K/UL (ref 0–0.04)
IMM GRANULOCYTES NFR BLD AUTO: 0 % (ref 0–0.5)
LYMPHOCYTES # BLD: 1.4 K/UL (ref 0.8–3.5)
LYMPHOCYTES NFR BLD: 18 % (ref 12–49)
MCH RBC QN AUTO: 17.5 PG (ref 26–34)
MCHC RBC AUTO-ENTMCNC: 25.7 G/DL (ref 30–36.5)
MCV RBC AUTO: 68.2 FL (ref 80–99)
MONOCYTES # BLD: 1 K/UL (ref 0–1)
MONOCYTES NFR BLD: 13 % (ref 5–13)
NEUTS SEG # BLD: 5.1 K/UL (ref 1.8–8)
NEUTS SEG NFR BLD: 63 % (ref 32–75)
NRBC # BLD: 0 K/UL (ref 0–0.01)
NRBC BLD-RTO: 0 PER 100 WBC
PLATELET # BLD AUTO: 154 K/UL (ref 150–400)
POTASSIUM SERPL-SCNC: 3.7 MMOL/L (ref 3.5–5.1)
RBC # BLD AUTO: 5.19 M/UL (ref 3.8–5.2)
RBC MORPH BLD: ABNORMAL
SODIUM SERPL-SCNC: 134 MMOL/L (ref 136–145)
WBC # BLD AUTO: 8 K/UL (ref 3.6–11)

## 2024-01-22 PROCEDURE — 2700000000 HC OXYGEN THERAPY PER DAY

## 2024-01-22 PROCEDURE — 6370000000 HC RX 637 (ALT 250 FOR IP): Performed by: INTERNAL MEDICINE

## 2024-01-22 PROCEDURE — 6370000000 HC RX 637 (ALT 250 FOR IP): Performed by: NURSE PRACTITIONER

## 2024-01-22 PROCEDURE — 6370000000 HC RX 637 (ALT 250 FOR IP): Performed by: HOSPITALIST

## 2024-01-22 PROCEDURE — 80048 BASIC METABOLIC PNL TOTAL CA: CPT

## 2024-01-22 PROCEDURE — 1100000000 HC RM PRIVATE

## 2024-01-22 PROCEDURE — 6360000002 HC RX W HCPCS

## 2024-01-22 PROCEDURE — 2580000003 HC RX 258: Performed by: HOSPITALIST

## 2024-01-22 PROCEDURE — 94761 N-INVAS EAR/PLS OXIMETRY MLT: CPT

## 2024-01-22 PROCEDURE — 36415 COLL VENOUS BLD VENIPUNCTURE: CPT

## 2024-01-22 PROCEDURE — 85025 COMPLETE CBC W/AUTO DIFF WBC: CPT

## 2024-01-22 PROCEDURE — 74230 X-RAY XM SWLNG FUNCJ C+: CPT

## 2024-01-22 PROCEDURE — 92611 MOTION FLUOROSCOPY/SWALLOW: CPT

## 2024-01-22 PROCEDURE — 6370000000 HC RX 637 (ALT 250 FOR IP): Performed by: STUDENT IN AN ORGANIZED HEALTH CARE EDUCATION/TRAINING PROGRAM

## 2024-01-22 PROCEDURE — 2500000003 HC RX 250 WO HCPCS: Performed by: STUDENT IN AN ORGANIZED HEALTH CARE EDUCATION/TRAINING PROGRAM

## 2024-01-22 RX ORDER — FUROSEMIDE 40 MG/1
40 TABLET ORAL DAILY
Status: DISCONTINUED | OUTPATIENT
Start: 2024-01-22 | End: 2024-01-24 | Stop reason: HOSPADM

## 2024-01-22 RX ADMIN — FERROUS SULFATE TAB 325 MG (65 MG ELEMENTAL FE) 325 MG: 325 (65 FE) TAB at 10:09

## 2024-01-22 RX ADMIN — PANTOPRAZOLE SODIUM 40 MG: 40 TABLET, DELAYED RELEASE ORAL at 05:42

## 2024-01-22 RX ADMIN — METOPROLOL TARTRATE 50 MG: 50 TABLET, FILM COATED ORAL at 10:09

## 2024-01-22 RX ADMIN — DOXYCYCLINE HYCLATE 100 MG: 100 CAPSULE ORAL at 22:54

## 2024-01-22 RX ADMIN — ISOSORBIDE MONONITRATE 30 MG: 30 TABLET, EXTENDED RELEASE ORAL at 10:09

## 2024-01-22 RX ADMIN — BARIUM SULFATE 50 ML: 0.81 POWDER, FOR SUSPENSION ORAL at 14:56

## 2024-01-22 RX ADMIN — BARIUM SULFATE 5 ML: 400 PASTE ORAL at 14:55

## 2024-01-22 RX ADMIN — PRAVASTATIN SODIUM 40 MG: 40 TABLET ORAL at 22:54

## 2024-01-22 RX ADMIN — LISINOPRIL 20 MG: 10 TABLET ORAL at 10:09

## 2024-01-22 RX ADMIN — RIVAROXABAN 15 MG: 15 TABLET, FILM COATED ORAL at 16:22

## 2024-01-22 RX ADMIN — CHLORTHALIDONE 25 MG: 25 TABLET ORAL at 10:10

## 2024-01-22 RX ADMIN — FUROSEMIDE 40 MG: 40 TABLET ORAL at 16:22

## 2024-01-22 RX ADMIN — FUROSEMIDE 40 MG: 10 INJECTION, SOLUTION INTRAMUSCULAR; INTRAVENOUS at 10:06

## 2024-01-22 RX ADMIN — GUAIFENESIN 600 MG: 600 TABLET, EXTENDED RELEASE ORAL at 10:09

## 2024-01-22 RX ADMIN — SODIUM CHLORIDE, PRESERVATIVE FREE 10 ML: 5 INJECTION INTRAVENOUS at 10:07

## 2024-01-22 RX ADMIN — DOCUSATE SODIUM 100 MG: 100 CAPSULE, LIQUID FILLED ORAL at 10:10

## 2024-01-22 RX ADMIN — SODIUM CHLORIDE, PRESERVATIVE FREE 10 ML: 5 INJECTION INTRAVENOUS at 22:54

## 2024-01-22 RX ADMIN — BARIUM SULFATE 5 ML: 400 SUSPENSION ORAL at 14:56

## 2024-01-22 RX ADMIN — DOXYCYCLINE HYCLATE 100 MG: 100 CAPSULE ORAL at 10:09

## 2024-01-22 RX ADMIN — AMOXICILLIN AND CLAVULANATE POTASSIUM 1 TABLET: 875; 125 TABLET, FILM COATED ORAL at 10:09

## 2024-01-22 RX ADMIN — AMOXICILLIN AND CLAVULANATE POTASSIUM 1 TABLET: 875; 125 TABLET, FILM COATED ORAL at 22:54

## 2024-01-22 RX ADMIN — GUAIFENESIN 600 MG: 600 TABLET, EXTENDED RELEASE ORAL at 22:54

## 2024-01-22 RX ADMIN — FERROUS SULFATE TAB 325 MG (65 MG ELEMENTAL FE) 325 MG: 325 (65 FE) TAB at 16:22

## 2024-01-22 RX ADMIN — BARIUM SULFATE 5 ML: 400 SUSPENSION ORAL at 14:55

## 2024-01-22 ASSESSMENT — ENCOUNTER SYMPTOMS
BACK PAIN: 0
DIARRHEA: 0
TROUBLE SWALLOWING: 0
NAUSEA: 0
COUGH: 0
VOMITING: 0
ABDOMINAL PAIN: 0
SORE THROAT: 0
RHINORRHEA: 0
SHORTNESS OF BREATH: 0
EYES NEGATIVE: 1
WHEEZING: 0
ALLERGIC/IMMUNOLOGIC NEGATIVE: 1

## 2024-01-22 NOTE — WOUND CARE
IP WOUND CONSULT    Melania Alvarenga  MEDICAL RECORD NUMBER:  [id number]  AGE: 89 y.o.   GENDER: female  : 1933  TODAY'S DATE:  2024    GENERAL     [] Follow-up   [x] New Consult    Melania Alvarenga is a 89 y.o. female referred by:   [x] Physician  [] Nursing  [] Other:         PAST MEDICAL HISTORY    Past Medical History:   Diagnosis Date    A-fib (HCC)     CAD (coronary artery disease)     Carotid atherosclerosis, bilateral     Carotid stent occlusion (HCC)     Hyperlipidemia     Hypertension     PVD (peripheral vascular disease) (HCC)     Stroke (HCC)         PAST SURGICAL HISTORY    Past Surgical History:   Procedure Laterality Date    CAROTID STENT PLACEMENT      HYSTEROPLASTY      INCONTINENCE SURGERY      Blader sling       FAMILY HISTORY    Family History   Problem Relation Age of Onset    Diabetes Father          ALLERGIES    Allergies   Allergen Reactions    Cucumber Extract      Other reaction(s): Unknown (comments)       MEDICATIONS    No current facility-administered medications on file prior to encounter.     Current Outpatient Medications on File Prior to Encounter   Medication Sig Dispense Refill    ezetimibe (ZETIA) 10 MG tablet Take 1 tablet by mouth daily      pravastatin (PRAVACHOL) 40 MG tablet Take 1 tablet by mouth daily      lisinopril (PRINIVIL;ZESTRIL) 10 MG tablet Take 1 tablet by mouth daily      metoprolol tartrate (LOPRESSOR) 50 MG tablet Take 1 tablet by mouth 2 times daily      aspirin 81 MG EC tablet Take 1 tablet by mouth daily      cyanocobalamin 1000 MCG/ML injection Inject 1 mL into the muscle every 30 days      rivaroxaban (XARELTO) 15 MG TABS tablet Take 1 tablet by mouth daily            BP (!) 102/54   Pulse 55   Temp 97.3 °F (36.3 °C) (Oral)   Resp 19   Ht 1.549 m (5' 1\")   Wt 68 kg (150 lb)   SpO2 96%   BMI 28.34 kg/m²     ASSESSMENT     Wound Identification & Type: 2 x skin tears to gluteal cleft  Dressing change: applied zinc paste and sacral foam  Verbal

## 2024-01-22 NOTE — CONSULTS
89 y.o. female with below mentioned past medical history lives alone at home, presents to the emergency room due to increased shortness of breath.  .  She also complains of severe back pain rating 10 on scale of 10 with recent fall few days ago. She is on anticoagulation with Xarelto think it is secondary to carotid artery stents. have a history of anemia but does not know the status of it, takes B12 injections every month.     In ER blood test showed hemoglobin was 6.4, MCV 65, hypochromic, showed severe iron deficiency,  Stool test pending,    Denies any melena, no hematochezia, no abdominal pain, do not remember when last time had colonoscopy.  Past Medical History        Past Medical History:   Diagnosis Date    A-fib (HCC)      CAD (coronary artery disease)      Carotid atherosclerosis, bilateral      Carotid stent occlusion (HCC)      Hyperlipidemia      Hypertension      PVD (peripheral vascular disease) (HCC)      Stroke (HCC)           Past Surgical History         Past Surgical History:   Procedure Laterality Date    CAROTID STENT PLACEMENT        HYSTEROPLASTY        INCONTINENCE SURGERY         Blader sling         Family History[]Expand by Default         Family History   Problem Relation Age of Onset    Diabetes Father           Social History           Tobacco Use    Smoking status: Never    Smokeless tobacco: Never   Substance Use Topics    Alcohol use: Never             Allergies   Allergen Reactions    Cucumber Extract         Other reaction(s): Unknown (comments)      Current Facility-Administered Medications             Current Facility-Administered Medications   Medication Dose Route Frequency Provider Last Rate Last Admin    0.9 % sodium chloride infusion   IntraVENous PRN He Musa MD        cefTRIAXone (ROCEPHIN) 1,000 mg in sterile water 10 mL IV syringe  1,000 mg IntraVENous Q24H Latasha Barrientos MD        doxycycline hyclate (VIBRAMYCIN) capsule 100 mg  100 mg Oral 2 times per 
INTERVENTIONAL RADIOLOGY  Consult Note      Patient:  Melania Alvarenga  :  1933  Age:  89 y.o.  MRN:  [id number]    Today's Date:  2024  Admission Date:  2024  Hospital Day:  6  Consult requested by:  Latasha Barrientos MD      CC / HPI   Melania Alvarenga is a 89 y.o. female with a history of HTN, HLD, CVA, PAD, CAD, afib, who presented to the ED witth acute on chronic back pain, shortness of breath and fatigue.  Lumbar and thoracic spine MRI findings of T5 acute versus subacute partial compression fracture; chronic partial compression fractures of C5, C6, T4, T9, L1, and L3; previous L1 kyphoplasty; moderate stenoses C5-C7 and L2-L3.  IR consultation is requested for possible kyphoplasty.    PAST MEDICAL HISTORY  Past Medical History:   Diagnosis Date    A-fib (HCC)     CAD (coronary artery disease)     Carotid atherosclerosis, bilateral     Carotid stent occlusion (HCC)     Hyperlipidemia     Hypertension     PVD (peripheral vascular disease) (HCC)     Stroke (HCC)      PAST SURGICAL HISTORY  Past Surgical History:   Procedure Laterality Date    CAROTID STENT PLACEMENT      HYSTEROPLASTY      INCONTINENCE SURGERY      Blader sling     SOCIAL HISTORY  Social History     Socioeconomic History    Marital status:      Spouse name: Not on file    Number of children: Not on file    Years of education: Not on file    Highest education level: Not on file   Occupational History    Not on file   Tobacco Use    Smoking status: Never    Smokeless tobacco: Never   Substance and Sexual Activity    Alcohol use: Never    Drug use: Never    Sexual activity: Not on file   Other Topics Concern    Not on file   Social History Narrative    Not on file     Social Determinants of Health     Financial Resource Strain: Not on file   Food Insecurity: No Food Insecurity (2024)    Hunger Vital Sign     Worried About Running Out of Food in the Last Year: Never true     Ran Out of Food in the Last Year: Never true 
Please see dictated note.  
Pulmonary and Critical Care Consult    Subjective:   Consult Note: 1/17/2024       Chief Complaint:   Chief Complaint   Patient presents with    Back Pain    Shortness of Breath        This patient has been seen and evaluated at the request of Dr. Casillas.     Patient is a 89 y.o. female with PMHx significant for afib, CAD, HTN, and HLD.     Pt presented to the ED 01/16 c/o RIGHT upper back pain and SOB. Pt was noted to be hypoxic by EMS with O2 sat of 88%. She was placed on 2L O2 via nasal cannula with improvement.     Pt is a never smoker and denies any history of chronic or previous pulmonary problems. Does not use inhalers or home oxygen.     Pt reports significant back pain and nonproductive/dry cough that has been going on for several weeks. She suspects she some weight loss as she has noticed her clothes fit a little looser. She also reports some mild swelling in her ankles. Recently noticed dizziness as well.     Pt reports sxs and breathing much improved after thoracentesis.    Review of Systems:  General: (+) weight loss (-) fever, fatigue  Respiratory: (+) SOB, cough (+) wheeze  Cardiovascular: (+) edema (-) chest pain, palpitations  GI: (-) nausea, vomiting, diarrhea  Neuro: (+) dizziness (-) headache, weakness    Past Medical History:   Diagnosis Date    A-fib (HCC)     CAD (coronary artery disease)     Carotid atherosclerosis, bilateral     Carotid stent occlusion (HCC)     Hyperlipidemia     Hypertension     PVD (peripheral vascular disease) (HCC)     Stroke (HCC)      Past Surgical History:   Procedure Laterality Date    CAROTID STENT PLACEMENT      HYSTEROPLASTY      INCONTINENCE SURGERY      Blader sling      Family History   Problem Relation Age of Onset    Diabetes Father      Social History     Tobacco Use    Smoking status: Never    Smokeless tobacco: Never   Substance Use Topics    Alcohol use: Never      Current Facility-Administered Medications   Medication Dose Route Frequency Provider Last 
0.60 mg/dL   Vitamin D 25 Hydroxy    Collection Time: 01/17/24  5:38 AM   Result Value Ref Range    Vit D, 25-Hydroxy 35.3 30 - 100 ng/mL   Echo (TTE) complete (PRN contrast/bubble/strain/3D)    Collection Time: 01/17/24 10:07 AM   Result Value Ref Range    Body Surface Area 1.71 m2    LA Volume Index MOD A4C 86 (A) 16 - 34 ml/m2    RA Volume Index A4C 37 mL/m2    Est. RA Pressure 8 mmHg    RVSP 34 mmHg    LV EDV A2C 48 mL    LV EDV A4C 40 mL    LV ESV A2C 23 mL    LV ESV A4C 18 mL    IVSd 1.5 (A) 0.6 - 0.9 cm    IVSd M-mode 1.6 (A) 0.6 - 0.9 cm    LVIDd 4.0 3.9 - 5.3 cm    LVIDd M-mode 4.7 3.9 - 5.3 cm    LVIDs 2.1 cm    LVIDs M-mode 3.2 cm    LVOT Mean Gradient 1 mmHg    LVOT VTI 16.2 cm    LVOT Peak Velocity 0.7 m/s    LVOT Peak Gradient 2 mmHg    LVPWd 1.0 (A) 0.6 - 0.9 cm    LVPWd M-mode 1.1 (A) 0.6 - 0.9 cm    LV E' Lateral Velocity 14 cm/s    LV E' Septal Velocity 6 cm/s    LV Ejection Fraction A2C 54 %    LV Ejection Fraction A4C 54 %    EF BP 52 (A) 55 - 100 %    LA Minor Axis 5.5 cm    LA Major Axis 6.1 cm    LA Area 2C 21.6 cm2    LA Area 4C 34.1 cm2    LA Volume MOD A2C 70 (A) 22 - 52 mL    LA Volume MOD A4C 144 (A) 22 - 52 mL    LA Volume  (A) 22 - 52 mL    LA Diameter 3.4 cm    RA Area 4C 19.8 cm2    RA Volume 62 ml    AR PHT 97.0 ms    AR Max Velocity PISA 1.2 m/s    AV Peak Velocity 1.8 m/s    AV Peak Gradient 13 mmHg    AV Mean Gradient 6 mmHg    AV VTI 29.9 cm    AV Mean Velocity 1.1 m/s    Aortic Root 2.7 cm    Ascending Aorta 2.7 cm    IVC Expiration 1.8 cm    IVC Inspiration 1.6 cm    MR .0 cm    MR Peak Velocity 5.0 m/s    MR Peak Gradient 100 mmHg    MV A Velocity 0.50 m/s    MV E Velocity 1.39 m/s    ME Max Velocity 1.4 m/s    Pulmonary Artery EDP 7 mmHg    PV Mean Gradient 3 mmHg    PV VTI 20.8 cm    PV Mean Velocity 0.7 m/s    PV Max Velocity 1.1 m/s    PV Peak Gradient 5 mmHg    Pulm Vein Peak D Velocity 0.5 m/s    Pulm Vein Peak S Velocity 0.4 m/s    Pulm Vein S/D 0.8 no 
IVC Expiration 1.8 cm    IVC Inspiration 1.6 cm    MR .0 cm    MR Peak Velocity 5.0 m/s    MR Peak Gradient 100 mmHg    MV A Velocity 0.50 m/s    MV E Velocity 1.39 m/s    GA Max Velocity 1.4 m/s    Pulmonary Artery EDP 7 mmHg    PV Mean Gradient 3 mmHg    PV VTI 20.8 cm    PV Mean Velocity 0.7 m/s    PV Max Velocity 1.1 m/s    PV Peak Gradient 5 mmHg    Pulm Vein Peak D Velocity 0.5 m/s    Pulm Vein Peak S Velocity 0.4 m/s    Pulm Vein S/D 0.8 no units    RV Basal Dimension 6.1 cm    RV Mid Dimension 4.0 cm    RV Free Wall Peak S' 9 cm/s    TAPSE 1.2 (A) 1.7 cm    TR Max Velocity 2.57 m/s    TR Peak Gradient 26 mmHg    Fractional Shortening 2D 48 28 - 44 %    LV ESV Index A4C 11 mL/m2    LV EDV Index A4C 24 mL/m2    LV ESV Index A2C 14 mL/m2    LV EDV Index A2C 29 mL/m2    LVIDd Index 2.40 cm/m2    LVIDs Index 1.26 cm/m2    LV RWT Ratio 0.50     LV Mass 2D 175.8 (A) 67 - 162 g    LV Mass 2D Index 105.3 (A) 43 - 95 g/m2    MV E/A 2.78     E/E' Ratio (Averaged) 16.55     E/E' Lateral 9.93     LA Volume Index BP 62 (A) 16 - 34 ml/m2    LA Volume Index MOD A2C 42 (A) 16 - 34 ml/m2    LA Size Index 2.04 cm/m2    LA/AO Root Ratio 1.26     Ao Root Index 1.62 cm/m2    Ascending Aorta Index 1.62 cm/m2    AV Velocity Ratio 0.39     LVOT:AV VTI Index 0.54    Cell Count with Differential, Body Fluid    Collection Time: 01/17/24  2:45 PM   Result Value Ref Range    Specimen Pleural fluid      Color, Fluid Yellow      Appearance, Fluid Cloudy      RBC, Fluid >100 (H) 0 /cu mm    Total Nucleated Cell Count 82 /cu mm    Polys, Fluid 23 (A) No reference range has been established. %    Bands, Fluid 0 %    Lymphocytes, Body Fluid 15 (A) No reference range has been established. %    Monocyte Count, Fluid 40 %    Eos, Fluid 0 (A) No reference range has been established. %    Mono/Macro, Fluid 12 (A) No reference range has been established. %    MESOTHELIAL 10 (A) No reference range has been established. %    Polys, Fluid 
reviewing results and records, decision making, and answering questions.      [place]  [facility] ([facility])  1/18/2024  9:38 AM

## 2024-01-23 LAB
ARTERIAL PATENCY WRIST A: YES
BASE EXCESS BLDA CALC-SCNC: 13.8 MMOL/L (ref 0–3)
BDY SITE: ABNORMAL
BODY TEMPERATURE: 97.9
COHGB MFR BLD: 1.4 % (ref 1–2)
FIO2 ON VENT: 21 %
HCO3 BLDA-SCNC: 40 MMOL/L (ref 22–26)
METHGB MFR BLD: 0.5 % (ref 0–1.4)
OXYHGB MFR BLD: 83.6 % (ref 95–99)
PCO2 BLDA: 61 MMHG (ref 35–45)
PERFORMED BY:: ABNORMAL
PH BLDA: 7.43 (ref 7.35–7.45)
PO2 BLDA: 49 MMHG (ref 80–100)
SAO2 % BLD: 85 % (ref 95–99)
SAO2% DEVICE SAO2% SENSOR NAME: ABNORMAL
SERVICE CMNT-IMP: ABNORMAL
SPECIMEN SITE: ABNORMAL

## 2024-01-23 PROCEDURE — 6370000000 HC RX 637 (ALT 250 FOR IP): Performed by: HOSPITALIST

## 2024-01-23 PROCEDURE — 94762 N-INVAS EAR/PLS OXIMTRY CONT: CPT

## 2024-01-23 PROCEDURE — 6370000000 HC RX 637 (ALT 250 FOR IP): Performed by: NURSE PRACTITIONER

## 2024-01-23 PROCEDURE — 2580000003 HC RX 258: Performed by: HOSPITALIST

## 2024-01-23 PROCEDURE — 97530 THERAPEUTIC ACTIVITIES: CPT

## 2024-01-23 PROCEDURE — 97165 OT EVAL LOW COMPLEX 30 MIN: CPT

## 2024-01-23 PROCEDURE — 1100000000 HC RM PRIVATE

## 2024-01-23 PROCEDURE — 6370000000 HC RX 637 (ALT 250 FOR IP): Performed by: STUDENT IN AN ORGANIZED HEALTH CARE EDUCATION/TRAINING PROGRAM

## 2024-01-23 PROCEDURE — 36600 WITHDRAWAL OF ARTERIAL BLOOD: CPT

## 2024-01-23 PROCEDURE — 82803 BLOOD GASES ANY COMBINATION: CPT

## 2024-01-23 PROCEDURE — 6370000000 HC RX 637 (ALT 250 FOR IP): Performed by: INTERNAL MEDICINE

## 2024-01-23 PROCEDURE — 97161 PT EVAL LOW COMPLEX 20 MIN: CPT

## 2024-01-23 RX ADMIN — FERROUS SULFATE TAB 325 MG (65 MG ELEMENTAL FE) 325 MG: 325 (65 FE) TAB at 09:01

## 2024-01-23 RX ADMIN — SODIUM CHLORIDE, PRESERVATIVE FREE 10 ML: 5 INJECTION INTRAVENOUS at 20:08

## 2024-01-23 RX ADMIN — AMOXICILLIN AND CLAVULANATE POTASSIUM 1 TABLET: 875; 125 TABLET, FILM COATED ORAL at 09:00

## 2024-01-23 RX ADMIN — DOCUSATE SODIUM 100 MG: 100 CAPSULE, LIQUID FILLED ORAL at 09:00

## 2024-01-23 RX ADMIN — CHLORTHALIDONE 25 MG: 25 TABLET ORAL at 09:01

## 2024-01-23 RX ADMIN — AMOXICILLIN AND CLAVULANATE POTASSIUM 1 TABLET: 875; 125 TABLET, FILM COATED ORAL at 20:08

## 2024-01-23 RX ADMIN — PANTOPRAZOLE SODIUM 40 MG: 40 TABLET, DELAYED RELEASE ORAL at 06:59

## 2024-01-23 RX ADMIN — METOPROLOL TARTRATE 25 MG: 25 TABLET, FILM COATED ORAL at 20:08

## 2024-01-23 RX ADMIN — ISOSORBIDE MONONITRATE 30 MG: 30 TABLET, EXTENDED RELEASE ORAL at 09:01

## 2024-01-23 RX ADMIN — FERROUS SULFATE TAB 325 MG (65 MG ELEMENTAL FE) 325 MG: 325 (65 FE) TAB at 16:44

## 2024-01-23 RX ADMIN — RIVAROXABAN 15 MG: 15 TABLET, FILM COATED ORAL at 09:00

## 2024-01-23 RX ADMIN — FUROSEMIDE 40 MG: 40 TABLET ORAL at 09:01

## 2024-01-23 RX ADMIN — DOXYCYCLINE HYCLATE 100 MG: 100 CAPSULE ORAL at 20:08

## 2024-01-23 RX ADMIN — GUAIFENESIN 600 MG: 600 TABLET, EXTENDED RELEASE ORAL at 20:08

## 2024-01-23 RX ADMIN — PRAVASTATIN SODIUM 40 MG: 40 TABLET ORAL at 20:08

## 2024-01-23 RX ADMIN — GUAIFENESIN 600 MG: 600 TABLET, EXTENDED RELEASE ORAL at 09:01

## 2024-01-23 RX ADMIN — LISINOPRIL 20 MG: 10 TABLET ORAL at 09:00

## 2024-01-23 RX ADMIN — DOXYCYCLINE HYCLATE 100 MG: 100 CAPSULE ORAL at 09:00

## 2024-01-23 RX ADMIN — METOPROLOL TARTRATE 25 MG: 25 TABLET, FILM COATED ORAL at 09:01

## 2024-01-23 RX ADMIN — SODIUM CHLORIDE, PRESERVATIVE FREE 10 ML: 5 INJECTION INTRAVENOUS at 09:01

## 2024-01-23 ASSESSMENT — ENCOUNTER SYMPTOMS
VOMITING: 0
SHORTNESS OF BREATH: 0
WHEEZING: 0
COUGH: 0
EYES NEGATIVE: 1
NAUSEA: 0
ABDOMINAL PAIN: 0
ALLERGIC/IMMUNOLOGIC NEGATIVE: 1
SORE THROAT: 0
BACK PAIN: 0
TROUBLE SWALLOWING: 0
DIARRHEA: 0

## 2024-01-24 VITALS
OXYGEN SATURATION: 100 % | DIASTOLIC BLOOD PRESSURE: 50 MMHG | HEIGHT: 61 IN | SYSTOLIC BLOOD PRESSURE: 111 MMHG | HEART RATE: 82 BPM | RESPIRATION RATE: 17 BRPM | BODY MASS INDEX: 25.81 KG/M2 | WEIGHT: 136.69 LBS | TEMPERATURE: 98 F

## 2024-01-24 LAB
ANION GAP SERPL CALC-SCNC: 4 MMOL/L (ref 5–15)
BASOPHILS # BLD: 0.1 K/UL (ref 0–0.1)
BASOPHILS NFR BLD: 2 % (ref 0–1)
BUN SERPL-MCNC: 51 MG/DL (ref 6–20)
BUN/CREAT SERPL: 50 (ref 12–20)
CA-I BLD-MCNC: 9.6 MG/DL (ref 8.5–10.1)
CHLORIDE SERPL-SCNC: 91 MMOL/L (ref 97–108)
CO2 SERPL-SCNC: 40 MMOL/L (ref 21–32)
CREAT SERPL-MCNC: 1.01 MG/DL (ref 0.55–1.02)
DIFFERENTIAL METHOD BLD: ABNORMAL
EOSINOPHIL # BLD: 0.4 K/UL (ref 0–0.4)
EOSINOPHIL NFR BLD: 7 % (ref 0–7)
ERYTHROCYTE [DISTWIDTH] IN BLOOD BY AUTOMATED COUNT: 27.6 % (ref 11.5–14.5)
GLUCOSE SERPL-MCNC: 97 MG/DL (ref 65–100)
HCT VFR BLD AUTO: 34.1 % (ref 35–47)
HGB BLD-MCNC: 8.9 G/DL (ref 11.5–16)
IMM GRANULOCYTES # BLD AUTO: 0.1 K/UL (ref 0–0.04)
IMM GRANULOCYTES NFR BLD AUTO: 1 % (ref 0–0.5)
LYMPHOCYTES # BLD: 1.3 K/UL (ref 0.8–3.5)
LYMPHOCYTES NFR BLD: 21 % (ref 12–49)
MCH RBC QN AUTO: 17.5 PG (ref 26–34)
MCHC RBC AUTO-ENTMCNC: 26.1 G/DL (ref 30–36.5)
MCV RBC AUTO: 67.1 FL (ref 80–99)
MONOCYTES # BLD: 0.8 K/UL (ref 0–1)
MONOCYTES NFR BLD: 13 % (ref 5–13)
NEUTS SEG # BLD: 3.5 K/UL (ref 1.8–8)
NEUTS SEG NFR BLD: 56 % (ref 32–75)
NRBC # BLD: 0 K/UL (ref 0–0.01)
NRBC BLD-RTO: 0 PER 100 WBC
PLATELET # BLD AUTO: 173 K/UL (ref 150–400)
POTASSIUM SERPL-SCNC: 3.2 MMOL/L (ref 3.5–5.1)
RBC # BLD AUTO: 5.08 M/UL (ref 3.8–5.2)
RBC MORPH BLD: ABNORMAL
SODIUM SERPL-SCNC: 135 MMOL/L (ref 136–145)
WBC # BLD AUTO: 6.2 K/UL (ref 3.6–11)

## 2024-01-24 PROCEDURE — 6370000000 HC RX 637 (ALT 250 FOR IP): Performed by: HOSPITALIST

## 2024-01-24 PROCEDURE — 2700000000 HC OXYGEN THERAPY PER DAY

## 2024-01-24 PROCEDURE — 94761 N-INVAS EAR/PLS OXIMETRY MLT: CPT

## 2024-01-24 PROCEDURE — 6370000000 HC RX 637 (ALT 250 FOR IP): Performed by: INTERNAL MEDICINE

## 2024-01-24 PROCEDURE — 2580000003 HC RX 258: Performed by: HOSPITALIST

## 2024-01-24 PROCEDURE — 85025 COMPLETE CBC W/AUTO DIFF WBC: CPT

## 2024-01-24 PROCEDURE — 80048 BASIC METABOLIC PNL TOTAL CA: CPT

## 2024-01-24 PROCEDURE — 6370000000 HC RX 637 (ALT 250 FOR IP): Performed by: NURSE PRACTITIONER

## 2024-01-24 PROCEDURE — 36415 COLL VENOUS BLD VENIPUNCTURE: CPT

## 2024-01-24 PROCEDURE — 6370000000 HC RX 637 (ALT 250 FOR IP): Performed by: STUDENT IN AN ORGANIZED HEALTH CARE EDUCATION/TRAINING PROGRAM

## 2024-01-24 RX ORDER — AMOXICILLIN AND CLAVULANATE POTASSIUM 875; 125 MG/1; MG/1
1 TABLET, FILM COATED ORAL EVERY 12 HOURS SCHEDULED
Qty: 20 TABLET | Refills: 0 | Status: SHIPPED | OUTPATIENT
Start: 2024-01-24 | End: 2024-02-03

## 2024-01-24 RX ORDER — FUROSEMIDE 40 MG/1
40 TABLET ORAL DAILY
Qty: 60 TABLET | Refills: 3 | Status: SHIPPED | OUTPATIENT
Start: 2024-01-25

## 2024-01-24 RX ORDER — DOXYCYCLINE HYCLATE 100 MG/1
100 CAPSULE ORAL EVERY 12 HOURS SCHEDULED
Qty: 20 CAPSULE | Refills: 0 | Status: SHIPPED | OUTPATIENT
Start: 2024-01-24 | End: 2024-02-03

## 2024-01-24 RX ORDER — ASPIRIN 81 MG/1
81 TABLET ORAL DAILY
Status: DISCONTINUED | OUTPATIENT
Start: 2024-01-24 | End: 2024-01-24 | Stop reason: HOSPADM

## 2024-01-24 RX ORDER — POTASSIUM CHLORIDE 20 MEQ/1
40 TABLET, EXTENDED RELEASE ORAL ONCE
Status: COMPLETED | OUTPATIENT
Start: 2024-01-24 | End: 2024-01-24

## 2024-01-24 RX ORDER — LISINOPRIL 10 MG/1
10 TABLET ORAL DAILY
Status: DISCONTINUED | OUTPATIENT
Start: 2024-01-25 | End: 2024-01-24 | Stop reason: HOSPADM

## 2024-01-24 RX ADMIN — DOXYCYCLINE HYCLATE 100 MG: 100 CAPSULE ORAL at 08:23

## 2024-01-24 RX ADMIN — DOCUSATE SODIUM 100 MG: 100 CAPSULE, LIQUID FILLED ORAL at 08:23

## 2024-01-24 RX ADMIN — FERROUS SULFATE TAB 325 MG (65 MG ELEMENTAL FE) 325 MG: 325 (65 FE) TAB at 08:23

## 2024-01-24 RX ADMIN — ASPIRIN 81 MG: 81 TABLET, COATED ORAL at 08:23

## 2024-01-24 RX ADMIN — GUAIFENESIN 600 MG: 600 TABLET, EXTENDED RELEASE ORAL at 08:23

## 2024-01-24 RX ADMIN — FUROSEMIDE 40 MG: 40 TABLET ORAL at 08:23

## 2024-01-24 RX ADMIN — RIVAROXABAN 15 MG: 15 TABLET, FILM COATED ORAL at 08:23

## 2024-01-24 RX ADMIN — SODIUM CHLORIDE, PRESERVATIVE FREE 10 ML: 5 INJECTION INTRAVENOUS at 08:23

## 2024-01-24 RX ADMIN — PANTOPRAZOLE SODIUM 40 MG: 40 TABLET, DELAYED RELEASE ORAL at 06:30

## 2024-01-24 RX ADMIN — AMOXICILLIN AND CLAVULANATE POTASSIUM 1 TABLET: 875; 125 TABLET, FILM COATED ORAL at 08:23

## 2024-01-24 RX ADMIN — POTASSIUM CHLORIDE 40 MEQ: 1500 TABLET, EXTENDED RELEASE ORAL at 10:01

## 2024-01-24 NOTE — PROGRESS NOTES
Hospitalist Progress Note    NAME:   Melania Alvarenga   : 1933   MRN: [id number]     Subjective:   Daily Progress Note: 2024     Hospital course to date/HPI from H&P:  Melania Alvarenga is  89 y.o. female with below mentioned past medical history lives alone at home independent of ADLs presents to the emergency room due to increased shortness of breath.  Started having congestive cough 10 days ago with shortness of breath getting worse in intensity.  This morning she woke up and trying to eat her breakfast and unable to finish due to shortness of breath.  Since then she continued to do get worse so presented to the emergency room.  She cannot recall any exposure to any sick contacts.  No fever or chills.  Denies any nausea or vomiting.  Admits nasal congestion and runny nose most of the time.  She also complains of severe back pain rating 10 on scale of 10 with recent fall few days ago.  She did not have any loss of consciousness or head injury.     Complains of shortness of breath worse with activity which is worse from her baseline.  Denies any swallowing difficulty.  Denies any bleeding per rectum but complains of pain in the rectum with ulcers occasionally.     She is on anticoagulation with Xarelto think it is secondary to carotid artery stents.  But on the monitor rate controlled atrial fibrillation and with history of atrial fibrillation.  Denies any cardiac history but she is seeing cardiologist regularly but think it is from something else.     She does have a history of anemia but does not know the status of it, takes B12 injections every month.  No previous labs are available at this time.  Found with a hemoglobin of 6.4 with microcytic picture.     Chest x-ray and CT scan suggestive of small bilateral pleural exudates and dilated cardiomegaly.  Also found with age-indeterminate thoracic compression fractures moderate  T10, severe T6 mild T5 fractures    Chief complaint: Shortness of 
      Hospitalist Progress Note    NAME:   Melania Alvarenga   : 1933   MRN: [id number]     Date/Time: 2024 2:41 PM  Patient PCP: Jaxon Peres MD    Estimated discharge date: 48 hours  Barriers: Pending MRI of thoracic and lumbar spine, likely need for kyphoplasty    Hospital course:  Melania Alvarenga is  89 y.o. female with below mentioned past medical history lives alone at home independent of ADLs presents to the emergency room due to increased shortness of breath.  Started having congestive cough 10 days ago with shortness of breath getting worse in intensity.  This morning she woke up and trying to eat her breakfast and unable to finish due to shortness of breath.  Since then she continued to do get worse so presented to the emergency room.  She cannot recall any exposure to any sick contacts.  No fever or chills.  Denies any nausea or vomiting.  Admits nasal congestion and runny nose most of the time.  She also complains of severe back pain rating 10 on scale of 10 with recent fall few days ago.  She did not have any loss of consciousness or head injury.     Complains of shortness of breath worse with activity which is worse from her baseline.  Denies any swallowing difficulty.  Denies any bleeding per rectum but complains of pain in the rectum with ulcers occasionally.     She is on anticoagulation with Xarelto think it is secondary to carotid artery stents.  But on the monitor rate controlled atrial fibrillation and with history of atrial fibrillation.  Denies any cardiac history but she is seeing cardiologist regularly but think it is from something else.     She does have a history of anemia but does not know the status of it, takes B12 injections every month.  No previous labs are available at this time.  Found with a hemoglobin of 6.4 with microcytic picture.     Chest x-ray and CT scan suggestive of small bilateral pleural exudates and dilated cardiomegaly.  Also found with age-indeterminate 
      Hospitalist Progress Note    NAME:   Melania Alvarenga   : 1933   MRN: [id number]     Date/Time: 2024 12:16 PM  Patient PCP: Jaxon Peres MD    Estimated discharge date: 48 hours  Barriers: Patient scheduled for kyphoplasty on Monday    Hospital course:  Patient is a 89-year-old female with a past medical history of A-fib on anticoagulation, CAD, HLD, HTN and CVA who was admitted on 2024 for acute hypoxic respiratory failure.  CXR revealed cardiomegaly with bibasilar atelectasis and a small right pleural effusion.  CTA of the chest revealed age-indeterminate thoracic compression fractures.  Bilateral pleural exudates, moderate on the right and small left.  Dilated cardiomyopathy.  No pulmonary embolism.  Suspicion for aspiration pneumonia and patient was initiated on p.o. Augmentin and Doxy with aspiration precautions.  Pulmonology consulted and recommended right-sided thoracentesis complete.  IR completed thoracentesis on 2024 removing approximately 600 mL of fluid.  Pleural fluid analysis pending.  Also, on admission patient was noted to have hemoglobin of 6.4.  1 unit of PRBC provided on that date. Iron panel: Ferritin 21, iron 14, TIBC 533, folate 41.4 B12 837.  Continues iron supplementation and Protonix.  GI consulted, but signed off.  Reconsult if active bleeding or hemoglobin decreases more than expected.  CTA of the chest revealed thoracic compression fractures. MRI of the thoracic and lumbar spine without contrast revealed T5 acute versus subacute partial compression fracture.  Chronic partial compression fractures of C5, C6, T4, T9 L1 and L3.  Previous kyphoplasty at L1.  Moderate stenosis at C5-7 and L2-3.  No stenosis in thoracic spine.  Otherwise mild stenosis in the lumbar spine.  IR consulted and plan for kyphoplasty on Monday, hold Xarelto until procedure complete. ABG 2024: pH 7.42, pCO2 70, pO2 91 and bicarb 45 Patient remains with elevated bicarb, will provide 2 
      Hospitalist Progress Note    NAME:   Melania Alvarenga   : 1933   MRN: [id number]     Date/Time: 2024 2:33 PM  Patient PCP: Jaxon Peres MD    Estimated discharge date: 48 hours  Barriers: MB pending    Hospital course:  Patient is a 89-year-old female with a past medical history of A-fib on anticoagulation, CAD, HLD, HTN and CVA who was admitted on 2024 for acute hypoxic respiratory failure.  CXR revealed cardiomegaly with bibasilar atelectasis and a small right pleural effusion.  CTA of the chest revealed age-indeterminate thoracic compression fractures.  Bilateral pleural exudates, moderate on the right and small left.  Dilated cardiomyopathy.  No pulmonary embolism.  Suspicion for aspiration pneumonia and patient was initiated on p.o. Augmentin and Doxy with aspiration precautions.  Pulmonology consulted and recommended right-sided thoracentesis complete.  IR completed thoracentesis on 2024 removing approximately 600 mL of fluid.  Pleural fluid analysis pending.  Also, on admission patient was noted to have hemoglobin of 6.4.  1 unit of PRBC provided on that date. Iron panel: Ferritin 21, iron 14, TIBC 533, folate 41.4 B12 837.  Continues iron supplementation and Protonix.  GI consulted, but signed off.  Reconsult if active bleeding or hemoglobin decreases more than expected.  CTA of the chest revealed thoracic compression fractures. MRI of the thoracic and lumbar spine without contrast revealed T5 acute versus subacute partial compression fracture.  Chronic partial compression fractures of C5, C6, T4, T9 L1 and L3.  Previous kyphoplasty at L1.  Moderate stenosis at C5-7 and L2-3.  No stenosis in thoracic spine.  Otherwise mild stenosis in the lumbar spine.  IR consulted and images were reviewed; no planned IR intervention at this time as the procedure would provide little to no benefit to patient. Resume Xarelto, hgb stable.  MB study pending. Spoke with the patient's son at the bedside 
    CARDIOLOGY PROGRESS NOTE      Patient Name: Melania Alvarenga  Age: 89 y.o.  Gender:female  :1933  MRN: [id number]    HISTORY OF PRESENT ILLNESS:     Melania Alvarenga is a 89 y.o. White (non-) female who has a history of chronic atrial fibrillation on Eliquis and normally follows with Dr. Leif Gonzales for cardiology.     Patient says that she lives independently.  She came to the ER on Tuesday,    due to right-sided back pain, shortness of breath, fatigue, and nasal congestion.  She says the back pain is pretty severe and rates it as 10 /10 for the last couple of days. She does endorse a few falls at home recently that sound mechanical. Most recent fall was in the bathroom, she slipped and fell between the toilet and the wall. She has a history of chronic anemia, hemoglobin on admission is 6.4, up to 7.2 today.       Seen and examined.  Resting in bed.  Denies chest pain or shortness of breath.  No palpitations.Feels that her breathing is improving.   Having some cough. No fever. Good uop overnight.   Telemetry reviewed. NO sig events.     Pertinent review of systems items noted above, all other systems are negative. Current medications reviewed.    Physical Examination    Allergies   Allergen Reactions    Cucumber Extract      Other reaction(s): Unknown (comments)     Vitals:    24 0416   BP: (!) 141/59   Pulse: 89   Resp: 20   Temp: 98.6 °F (37 °C)   SpO2: 100%     Vital signs are stable  No apparent distress.  Heart has a irregularly irregular rhythm.  No murmur  Lungs with scattered wheezing /rasp  Abdomen is soft, nontender, normal bowel sounds.  Extremities have trace edema  Skin is dry and warm.  Normal affect    Labs reviewed:  No results found for this or any previous visit (from the past 12 hour(s)).       A/P:   Afib: Rate controlled overall. Xarelto on hold given anemia, Hgb 7.5 today, pending further GI work-up. Look to resume OAC when able. Continue Lopressor 50mg BID. Echo 
    Speech LAnguage Pathology Dysphagia EVALUATION/DISCHARGE    Patient: Melania Alvarenga (89 y.o. female)  Date: 1/17/2024  Primary Diagnosis: Acute respiratory failure with hypoxia (HCC) [J96.01]  Anemia, unspecified type [D64.9]       Precautions: Aspiration, fall                  DIET RECOMMENDATIONS: Regular and thin liquids, meds as tolerated,    SWALLOW SAFETY PRECAUTIONS: Rec slow rate of intake, small bites/sips, effortful swallow, and remain upright 30 minutes after PO intake.       ASSESSMENT :  Based on the objective data described below, the patient presents with wfl oropharyngeal swallow fxn.  No facial droop or dysarthria. Informally, speech language is wfl.  She denies hx of dysphagia or frequent pna. She denies hx of GERD, early satiety and regurgitation of food. Per patient and granddaughter there is no hx of dysphagia and has good appetite.  Oral phase is wfl. Pharyngeal phase c/b timely swallow and HLE Is wlf upon palpation.   No overt s/s of pen/asp observed.   Patient will be discharged from skilled speech-language pathology services at this time.       PLAN :  Recommendations and Planned Interventions:  DIET RECOMMENDATIONS: Regular and thin liquids, meds as tolerated,    SWALLOW SAFETY PRECAUTIONS: Rec slow rate of intake, small bites/sips, effortful swallow, and remain upright 30 minutes after PO intake.    If concerns for aspiration are present, rec MBS to r/o silent aspiration as bedside swallow evaluation is negative.   Acute SLP Services: No, patient will be discharged from acute skilled speech-language pathology at this time.    Discharge Recommendations: None     SUBJECTIVE:   Patient reports decreased PO intake s/t back pain x1 month    OBJECTIVE:     Past Medical History:   Diagnosis Date    A-fib (HCC)     CAD (coronary artery disease)     Carotid atherosclerosis, bilateral     Carotid stent occlusion (HCC)     Hyperlipidemia     Hypertension     PVD (peripheral vascular disease) 
24 hour chart check complete. Signed and held orders released.  
24 hr Chart Check complete.  
4 Eyes Skin Assessment     NAME:  Melania Alvarenga  YOB: 1933  MEDICAL RECORD NUMBER:  [id number]    The patient is being assessed for  Admission    I agree that at least one RN has performed a thorough Head to Toe Skin Assessment on the patient. ALL assessment sites listed below have been assessed.      Areas assessed by both nurses:    Head, Face, Ears, Shoulders, Back, Chest, Arms, Elbows, Hands, Sacrum. Buttock, Coccyx, Ischium, Legs. Feet and Heels, and Under Medical Devices         Does the Patient have a Wound? Yes wound(s) were present on assessment. LDA wound assessment was Initiated and completed by RN       Jeremy Prevention initiated by RN: Yes  Wound Care Orders initiated by RN: Yes    Pressure Injury (Stage 3,4, Unstageable, DTI, NWPT, and Complex wounds) if present, place Wound referral order by RN under : Yes    New Ostomies, if present place, Ostomy referral order under : No     Nurse 1 eSignature: Electronically signed by Kellen Ashby RN on 1/17/24 at 5:32 PM EST    **SHARE this note so that the co-signing nurse can place an eSignature**    Nurse 2 eSignature: Electronically signed by Umu Richard RN on 1/17/24 at 5:34 PM EST    
Comprehensive Nutrition Assessment    Type and Reason for Visit:  Initial, RD Nutrition Re-Screen/LOS    Nutrition Recommendations/Plan:   Adjust PO diet to MM5/Thins  Encourage PO, help with tray set-up, and document % consumed in I/O     Malnutrition Assessment:  Malnutrition Status:  Insufficient data (01/23/24 1512)    Context:  Acute Illness     Findings of the 6 clinical characteristics of malnutrition:  Energy Intake:  No significant decrease in energy intake  Weight Loss:  Unable to assess     Body Fat Loss:  Unable to assess     Muscle Mass Loss:  Unable to assess    Fluid Accumulation:  No significant fluid accumulation     Strength:  Not Performed    Nutrition Assessment:    Admitted for severe back pain, falls. +Spinal compression fx  initially pending kyphoplasty however deemed to be no benefit. Assessment for LOS, limited d/t [place]. No wt hx to assess. Pt endorsed consuming 25-50% of Breakfast; RD set pt up for L, returned later and pt consumed >75% of meal independently. Adjusted diet texture this AM per SLP. No other interventions needed at this time. Labs: Na 134, BUN 34, GFR 53, Alb 3.2, Hgb 9.1.  Meds: augmentin, chlorthalidone, colace, vibramycin, FeSu, lasix, mucinex, lisinopril, PPI, Statin, xarelto.      Nutrition Related Findings:    NFPE deferred, pt eating lunch and later napping.SLP following for dysphagia, RD adjusted diet this AM per SLP recs post-MBS. No N/V/D/C, last BM 1/23. Trace generalized edema. Wound Type: Skin Tears (sacrum)         Current Nutrition Intake & Therapies:    Average Meal Intake: %, 51-75%  Average Supplements Intake: None Ordered  ADULT DIET; Dysphagia - Minced and Moist    Anthropometric Measures:  Height: 154.9 cm (5' 0.98\")  Ideal Body Weight (IBW): 105 lbs (48 kg)       Current Body Weight: 62 kg (136 lb 11 oz), 130.2 % IBW. Weight Source: Bed Scale  Current BMI (kg/m2): 25.8  Usual Body Weight: 49.9 kg (110 lb) (per pt?)  % Weight Change (Calculated): 
OCCUPATIONAL THERAPY EVALUATION  Patient: Melania Alvarenga (89 y.o. female)  Date: 1/23/2024  Primary Diagnosis: Acute respiratory failure with hypoxia (HCC) [J96.01]  Anemia, unspecified type [D64.9]       Precautions: Fall Risk, Bed Alarm                Recommendations for nursing mobility: Use of bed/chair alarm for safety, Use of BSC for toileting , and Assist x2    In place during session:Peripheral IV, Nasal Cannula 2L, External Catheter, and EKG/telemetry   ASSESSMENT  Pt is a 89 y.o. female presenting to [facility] with c/o SOB and back pain, admitted 1/16 and currently being treated for acute RF w/ hypoxia and spinal compression fx. MRI of thoracic and lumbar spine shows T5 acute vs. Subacute partial compression fx. Spoke w/ Vickie Ellis NP and at this time no surgical intervention or need for back brace; pt cleared for OOB mobility. Pt educated on log roll technique for bed mobility; pt would benefit from continued education. Pt received semi-supine in bed upon arrival, AXO x3 (disoriented to time), and agreeable to OT/PT evaluation. Pt is [place].    Based on current observations, pt presents with decreased  functional mobility, independence in ADLs, ROM, strength, activity tolerance, endurance, balance (see below for objective details and assist levels).     Overall, pt tolerates session fair w/out c/o pain, dizziness, or SOB. Pt req'd min A for bed mobility and additional time; pt demo'd good understanding of use of rail to A. Pt req'd min-mod A x1-2 for OOB mobility; on initial stand, pt req'd min A x1, however, on second attempt pt req'd mod A x2. Pt demo'd difficulty taking side steps toward HOB especially her RLE. Unable to doff/don B socks and req'd A for LB bathing. Pt will benefit from continued skilled OT services to address current impairments and improve IND and safety with self cares and functional transfers/mobility. Potential barriers for safe discharge: pt lives alone and pt is a high fall risk. 
OT order received and chart reviewed. Evaluation held as patient scheduled to undergo kyphoplasty on Monday 1/22/24. Will hold evaluation until procedure is completed. Thank you.   
OT/PT eval order received and acknowledged, attempted at 0954 however per medical chart, no planned IR intervention at this time for T5 acute vs. Subacute partial compression fx. Discussed w/ NP needing an order for a back brace if appropriate and cleared for mobility. NP aware. Will continue to follow pt and attempt OT eval at a later time as medically appropriate. Thank you.    
PT  order received and chart reviewed. Evaluation held as patient scheduled to undergo kyphoplasty on Monday 1/22/24. Will hold evaluation until procedure is completed. Thank you.         
PT consult received and acknowledged. PT eval attempted. However, per Dr. Barrientos's note on 1/16, CT chest suggestive of severe thoracic spine fracture T6 and moderate T10 and T5 fractures. Awaiting interventional radiology consultation to see is if pt is a candidate for Kyphoplasty. PT deferred at this time, will continue to follow and attempt for PT eval when medically appropriate. Thanks     
PT eval order received and acknowledged. PT eval attempted at 0743 however per medical records pt pending kyphoplasty. Will continue to follow patient and attempt PT eval at a later time. Thank you.    
PT eval order received and acknowledged. PT eval attempted at 0815 however per PA pt undergoing kyphoplasty on Monday 1/22/24. Will hold PT evaluation until procedure completed. Will continue to follow patient and attempt PT eval at a later time. Thank you.    
PT for likely/potential kyphoplasty on Monday, 1/22/24. Per cardiology note, eliquis on hold due to anemia, Please continue to hold , must be a 4 dose hold for planned kyphoplasty.   
Patient BP around 8pm was 130/71 and HR 71 she received her night dose of Metoprolol 50 mg this evening and Bp came down to 103/50  HR 51.  Telemetry called and stated patient HR was running from 40 - 60's.   Dr. Pizano notified, he stated to hold patient  scheduled Diamox this evening and continue to monitor patient. No new orders at this time.  
Patient scheduled for MBS today however, patient is NPO for kyphoplasty.   Will reschedule MBS for 1/23/24 if patient is appropriate. Please resume diet of SBS, moderately thick liquids following procedure if appropriate.     
Pt hypotensive BP 86/61 at 1558. Pt reports no symptoms, O2 at 73% RA. 0.5 L NC applied, pt place in supine position. BP increased to 102/64. O2 increased to 96% on 0.5LPM.  
Pt received breakfast tray, assessed swallowing at bedside with bites of eggs and sips of coffee, no notable concerns at this time, per pt swallowing feels fine, states no difficulty, granddaughter at bedside, she states no difficulty of swallowing that she is aware of.  
Pulmonary and Critical Care progress note    Subjective:   Consult Note: 1/19/2024       Chief Complaint:   Chief Complaint   Patient presents with    Back Pain    Shortness of Breath        This patient has been seen and evaluated at the request of Dr. Casillas.     Patient seen and examined  Overnight events noted    Sitting up in bed comfortably  Having breakfast  Awake and alert  On 2 L nasal cannula oxygen  Admits to improved respiratory status and breathing    S/p thoracentesis 1/17  Results pending    Review of Systems:  General: (+) weight loss (-) fever, fatigue  Respiratory: (+) SOB, cough (+) wheeze  Cardiovascular: (+) edema (-) chest pain, palpitations  GI: (-) nausea, vomiting, diarrhea  Neuro: (+) dizziness (-) headache, weakness       Current Facility-Administered Medications   Medication Dose Route Frequency Provider Last Rate Last Admin    amoxicillin-clavulanate (AUGMENTIN) 875-125 MG per tablet 1 tablet  1 tablet Oral 2 times per day Ariel Casillas MD   1 tablet at 01/19/24 0809    pantoprazole (PROTONIX) tablet 40 mg  [facility]   40 mg at 01/19/24 0615    furosemide (LASIX) injection 40 mg  40 mg IntraVENous Daily Pankaj Mario APRN - NP   40 mg at 01/19/24 0809    pravastatin (PRAVACHOL) tablet 40 mg  40 mg Oral Nightly Latasha Barrientos MD   40 mg at 01/18/24 2053    lisinopril (PRINIVIL;ZESTRIL) tablet 20 mg  20 mg Oral Daily Latasha Barrientos MD   20 mg at 01/19/24 0810    metoprolol tartrate (LOPRESSOR) tablet 50 mg  50 mg Oral BID Latasha Barrientos MD   50 mg at 01/19/24 0809    sodium chloride flush 0.9 % injection 5-40 mL  5-40 mL IntraVENous 2 times per day Latasha Barrientos MD   10 mL at 01/19/24 0810    sodium chloride flush 0.9 % injection 5-40 mL  5-40 mL IntraVENous PRN Latasha Barrientos MD        0.9 % sodium chloride infusion   IntraVENous PRN Latasha Barrientos MD        ondansetron (ZOFRAN-ODT) disintegrating tablet 4 mg  4 mg 
Pulmonary and Critical Care progress note    Subjective:   Consult Note: 1/22/2024       Chief Complaint:   Chief Complaint   Patient presents with    Back Pain    Shortness of Breath        This patient has been seen and evaluated at the request of Dr. Casillas.     Patient seen and examined. No acute overnight events. Laying in bed comfortably. She is awake and alert and currently on NC 1L. She reports that her respiratory status and breathing has improved and she feels well.    S/p thoracentesis 1/17  Body fluid culture showed no organisms and no growth 4 days.    Review of Systems:  General: (+) weight loss (-) fever, fatigue  Respiratory: (+) SOB, cough (+) wheeze  Cardiovascular: (+) edema (-) chest pain, palpitations  GI: (-) nausea, vomiting, diarrhea  Neuro: (+) dizziness (-) headache, weakness       Current Facility-Administered Medications   Medication Dose Route Frequency Provider Last Rate Last Admin    docusate sodium (COLACE) capsule 100 mg  100 mg Oral Daily Bull Galo PA-C   100 mg at 01/22/24 1010    ferrous sulfate (IRON 325) tablet 325 mg  325 mg Oral BID  Bull Galo PA-C   325 mg at 01/22/24 1009    amoxicillin-clavulanate (AUGMENTIN) 875-125 MG per tablet 1 tablet  1 tablet Oral 2 times per day Ariel Casillas MD   1 tablet at 01/22/24 1009    pantoprazole (PROTONIX) tablet 40 mg  40 mg Oral QAM  Kenneth Ward MD   40 mg at 01/22/24 0542    furosemide (LASIX) injection 40 mg  40 mg IntraVENous Daily Pankaj Mario APRN - NP   40 mg at 01/22/24 1006    pravastatin (PRAVACHOL) tablet 40 mg  40 mg Oral Nightly Latasha Barrientos MD   40 mg at 01/21/24 2012    lisinopril (PRINIVIL;ZESTRIL) tablet 20 mg  20 mg Oral Daily Latasha Barrientos MD   20 mg at 01/22/24 1009    metoprolol tartrate (LOPRESSOR) tablet 50 mg  50 mg Oral BID Latasha Barrientos MD   50 mg at 01/22/24 1009    sodium chloride flush 0.9 % injection 5-40 mL  5-40 mL IntraVENous 2 times per day Iliana 
Pulmonary and Critical Care progress note    Subjective:   Consult Note: 1/24/2024       Chief Complaint:   Chief Complaint   Patient presents with    Back Pain    Shortness of Breath        This patient has been seen and evaluated at the request of Dr. Casillas.     1/23/24: Patient seen and examined in the room.  She is awake and alert on 2 L oxygen.  Discussed with primary attending.  She had a nocturnal pulse oximetry done with continuous recording showing desaturations.  Discussed below.  Plans are for discharge with home O2.      On 1/22/24: Patient seen and examined. No acute overnight events. Laying in bed comfortably. She is awake and alert and currently on NC 1L. She reports that her respiratory status and breathing has improved and she feels well.  Discussed with the son at the bedside.  RN is also at the bedside.  Apparently patient had an episode of desaturation yesterday on room air in the mid 70% range.  Started on nasal cannula.  Currently saturating 99% on 1 L.  Also IR not planning to do kyphoplasty.  S/p thoracentesis 1/17  Body fluid culture showed no organisms and no growth 4 days.    Review of Systems:  General: (+) weight loss (-) fever, fatigue  Respiratory: (+) SOB, cough (+) wheeze  Cardiovascular: (+) edema (-) chest pain, palpitations  GI: (-) nausea, vomiting, diarrhea  Neuro: (+) dizziness (-) headache, weakness       Current Facility-Administered Medications   Medication Dose Route Frequency Provider Last Rate Last Admin    aspirin EC tablet 81 mg  81 mg Oral Daily Latasha Barrientos MD   81 mg at 01/24/24 0823    [START ON 1/25/2024] lisinopril (PRINIVIL;ZESTRIL) tablet 10 mg  10 mg Oral Daily Lucy Campbell APRN - NP        metoprolol tartrate (LOPRESSOR) tablet 25 mg  25 mg Oral BID Lucy Campbell APRN - NP   25 mg at 01/23/24 2008    rivaroxaban (XARELTO) tablet 15 mg  15 mg Oral Daily Vickie Ellis APRN - NP   15 mg at 01/24/24 0823    furosemide (LASIX) tablet 40 mg  40 
Report and discharge summary Given to Marta ALVARADO at 1305. No question or concerns from the nurse.     Discharge summary and instructions provided to the patient. No questions or concerns at this time. Peripheral IV removed- catheter intact, no complications. Telemetry removed and sent to . Patient groomed and dressed by staff. Patient cleared by Cardiologist, Pulmonologist, GI, and Attending.     Transportation will arrive at 4:45PM to  the patient. Discharge summary and instructions provided to the son at bedside.     Transportation arrived at 1700 to  patient, son at bedside. Face sheet given to transportation.   
Spiritual Care Assessment/Progress Note  [facility]    Name: Melania Alvarenga MRN: [id number]    Age: 89 y.o.     Sex: female   Language: English     Date: 1/22/2024            Total Time Calculated: 11 min              Spiritual Assessment begun in [facility] [place]  Service Provided For:: Patient and family together  Referral/Consult From:: Rounding  Encounter Overview/Reason : Initial Encounter    Spiritual beliefs:      [] Involved in a rusty tradition/spiritual practice:      [] Supported by a rusty community:      [] Claims no spiritual orientation:      [] Seeking spiritual identity:           [] Adheres to an individual form of spirituality:      [x] Not able to assess:                Identified resources for coping and support system:   Support System: Children, Family members       [] Prayer                  [] Devotional reading               [] Music                  [] Guided Imagery     [] Pet visits                                        [] Other: (COMMENT)     Specific area/focus of visit   Encounter:    Crisis:    Spiritual/Emotional needs: Type: Spiritual Support  Ritual, Rites and Sacraments:    Grief, Loss, and Adjustments:    Ethics/Mediation:    Behavioral Health:    Palliative Care:    Advance Care Planning:      Plan/Referrals: Other (Comment) ( is available if needed)    Narrative: [facility]-Rounding visit in [facility]. Patient and pt's grandson were present during the visit. Patient expressed her feeling of discomfort due to her medical condition.  explored more about her feelings, thoughts, support system, and coping mechanism. Patient shared about support system. She had been receiving a wonderful care and support from her family. Patient was encouraged by all the support. Mrs. Alvarenga expressed how it makes her feeling better and good when she saw all the faces who support her, including her grandson who was sitting in the room with her. The Patient also shared that 
13.8 (H) 0 - 3 mmol/L    O2 Method Room air      FIO2 Arterial 21 %    Source Arterial      Site Right Radial      Ze Test YES      Carboxyhgb, Arterial 1.4 1 - 2 %    Methemoglobin, Arterial 0.5 0 - 1.4 %    Oxyhemoglobin 83.6 (L) 95 - 99 %    Performed by: Otto Lowery     Critical Value Read Back       Called to Mame Celis  on 01/23/2024 at 04:54    Temperature 97.9          Case discussed with Dr. Osborne and our impression and recommendations are as follows:  Afib: Rate controlled overall. Xarelto on hold given anemia requiring transfusion.  Look to resume OAC when able. Tolerating Lopressor 25mg BID given low rates. Echo this admission with EF 60-65%. NWM.   Shortness of breath/ pulmonary hypertension: Echo with preserved EF. ProBNP 2359 today, chest xray with small to moderate pleural effusions.s/p thoracentesis 1/17 600cc removed. Continue with IV lasix, will need oral upon discharge. Monitor Cr and electrolytes. Need strict I&Os, daily weights   Hypertension: BP at goal  Spinal compression fractures/ chronic back pain: kypho cancelled per IR, defer to primary.     Will see peripherally    Please do not hesitate to call if additional questions arise.    ESTER MG, APRN - NP  1/23/2024  
Collection Time: 01/18/24  5:37 AM   Result Value Ref Range    WBC 6.4 3.6 - 11.0 K/uL    RBC 4.14 3.80 - 5.20 M/uL    Hemoglobin 7.2 (L) 11.5 - 16.0 g/dL    Hematocrit 28.7 (L) 35.0 - 47.0 %    MCV 69.3 (L) 80.0 - 99.0 FL    MCH 17.4 (L) 26.0 - 34.0 PG    MCHC 25.1 (L) 30.0 - 36.5 g/dL    RDW 25.1 (H) 11.5 - 14.5 %    Platelets 147 (L) 150 - 400 K/uL    Nucleated RBCs 1.1 (H) 0.0  WBC    nRBC 0.07 (H) 0.00 - 0.01 K/uL    Neutrophils % 70 32 - 75 %    Lymphocytes % 14 12 - 49 %    Monocytes % 10 5 - 13 %    Eosinophils % 5 0 - 7 %    Basophils % 1 0 - 1 %    Immature Granulocytes 0 %    Neutrophils Absolute 4.5 1.8 - 8.0 K/UL    Lymphocytes Absolute 0.9 0.8 - 3.5 K/UL    Monocytes Absolute 0.6 0.0 - 1.0 K/UL    Eosinophils Absolute 0.3 0.0 - 0.4 K/UL    Basophils Absolute 0.1 0.0 - 0.1 K/UL    Absolute Immature Granulocyte 0.0 K/UL    Differential Type Smear Scanned      RBC Comment Microcytosis  1+        RBC Comment Hypochromia  2+        RBC Comment Ovalocytes  1+        RBC Comment Target Cells  1+        RBC Comment Schistocytes  1+       Renal Function Panel    Collection Time: 01/18/24  5:37 AM   Result Value Ref Range    Sodium 137 136 - 145 mmol/L    Potassium 4.0 3.5 - 5.1 mmol/L    Chloride 102 97 - 108 mmol/L    CO2 35 (H) 21 - 32 mmol/L    Anion Gap 0 (L) 5 - 15 mmol/L    Glucose 82 65 - 100 mg/dL    BUN 14 6 - 20 mg/dL    Creatinine 0.58 0.55 - 1.02 mg/dL    Bun/Cre Ratio 24 (H) 12 - 20      Est, Glom Filt Rate >60 >60 ml/min/1.73m2    Calcium 8.8 8.5 - 10.1 mg/dL    Phosphorus 3.9 2.6 - 4.7 mg/dL    Albumin 3.2 (L) 3.5 - 5.0 g/dL   Brain Natriuretic Peptide    Collection Time: 01/18/24  5:37 AM   Result Value Ref Range    NT Pro-BNP 2,359 (H) <450 pg/mL   EKG 12 Lead    Collection Time: 01/18/24  7:33 AM   Result Value Ref Range    Ventricular Rate 64 BPM    Atrial Rate 74 BPM    QRS Duration 98 ms    Q-T Interval 440 ms    QTc Calculation (Bazett) 453 ms    R Axis 56 degrees    T Axis 173 
controlled overall. Xarelto on hold given anemia, Hgb 7.2 today, pending further GI work-up. Look to resume OAC when able. Continue Lopressor 50mg BID. Echo this admission with EF 60-65%. NWM.   Shortness of breath/ pulmonary hypertension: Echo with preserved EF. ProBNP 2359 today, chest xray with small to moderate pleural effusions.s/p thoracentesis 1/17 600cc removed. Will diurese with IV lasix. Monitor Cr and electrolytes. Need strict I&Os, daily weights   Hypertension: BP controlled, continue current meds   Spinal compression fractures/ chronic back pain: MRI done, per primary.     Please do not hesitate to call if additional questions arise.    ESTER MG, APRN - NP  1/19/2024  
fractures/ chronic back pain: kypho cancelled per IR, defer to primary.     Please do not hesitate to call if additional questions arise.    JELENA DUCKWORTH - NP  1/22/2024  
fractures/ chronic back pain: kypho cancelled per IR, defer to primary.     Will see peripherally    Please do not hesitate to call if additional questions arise.    ESTER MG, JELENA - NP  1/24/2024  
motion.  Normal diastolic function.  -Cardiology consulted and recommended IV Lasix.    Hypokalemia  -Potassium 3.2  -Repleted continue to monitor    Metabolic alkalosis  -Bicarb 41  -Continue to monitor, if no improvement tomorrow we will provide Diamox    Chronic A-fib on Xarelto  -Hold Xarelto at this time due to low hemoglobin and scheduled kyphoplasty on Monday  -Continue metoprolol  -Cardiology following and will reinitiate anticoagulation after procedure    HTN and HLD  -Continue Imdur, lisinopril, metoprolol and pravastatin    Medical Decision Making:   I personally reviewed labs: CBC BMP BNP  I personally reviewed imaging: None  Toxic drug monitoring: Adverse reaction to antibiotics  Discussed case with: Patient and RN    Code Status: Full  DVT Prophylaxis: Not indicated due to low hemoglobin  GI Prophylaxis: Protonix    Subjective:     Chief Complaint / Reason for Physician Visit  Patient evaluated at bedside resting comfortably this morning.  No new complaints at this time.  Patient scheduled for kyphoplasty on Monday.  Continue to hold Eliquis, must be 4 doses on hold for planned kyphoplasty.  Discussed with RN events overnight.       Objective:     VITALS:   Last 24hrs VS reviewed since prior progress note. Most recent are:  Patient Vitals for the past 24 hrs:   BP Temp Temp src Pulse Resp SpO2   01/20/24 1301 133/63 -- -- -- -- --   01/20/24 1109 130/63 -- -- 59 -- --   01/20/24 0848 (!) 111/55 -- -- 85 -- --   01/20/24 0757 (!) 111/55 98.8 °F (37.1 °C) -- 84 -- 98 %   01/20/24 0416 (!) 141/59 98.6 °F (37 °C) Axillary 89 20 100 %   01/19/24 1955 118/66 98.4 °F (36.9 °C) Axillary 78 16 98 %   01/19/24 1603 (!) 107/53 98.2 °F (36.8 °C) Oral 70 15 100 %           Intake/Output Summary (Last 24 hours) at 1/20/2024 1425  Last data filed at 1/20/2024 0635  Gross per 24 hour   Intake --   Output 650 ml   Net -650 ml          I had a face to face encounter and independently examined this patient on 1/20/2024, 
1.4 %    Oxyhemoglobin 83.6 (L) 95 - 99 %    Performed by: Otto Lowery     Critical Value Read Back       Called to Mame Celis  on 01/23/2024 at 04:54    Temperature 97.9         FL MODIFIED BARIUM SWALLOW W VIDEO   Final Result   Modified barium swallow as above.  Please see the separately   dictated Speech Pathology report for additional details and recommendations.      MRI THORACIC SPINE WO CONTRAST   Final Result      1. T5 acute versus subacute partial compression fracture.   2. Chronic partial compression fractures of C5, C6, T4, T9, L1, and L3. Previous   kyphoplasty at L1.   3. Moderate stenoses C5-C7 and L2-L3.   4. No stenosis in the thoracic spine.   5. Otherwise, mild stenoses in the lumbar spine.          MRI LUMBAR SPINE WO CONTRAST   Final Result      1. T5 acute versus subacute partial compression fracture.   2. Chronic partial compression fractures of C5, C6, T4, T9, L1, and L3. Previous   kyphoplasty at L1.   3. Moderate stenoses C5-C7 and L2-L3.   4. No stenosis in the thoracic spine.   5. Otherwise, mild stenoses in the lumbar spine.          XR CHEST PORTABLE   Final Result      Stable mild to moderate edema pattern. Decreasing small to moderate right   pleural effusion with improving hazy right basilar atelectasis/airspace disease.   Stable small-to-moderate left pleural effusion with similar left basilar   collapse/consolidation.           IR GUIDED THORACENTESIS PLEURAL   Final Result   Technically successful ultrasound guided right thoracentesis yielding   approximately 600 ml of fluid.         CT ABDOMEN PELVIS WO CONTRAST Additional Contrast? None   Final Result   1.  No bowel obstruction or ascites.   2.  Small bilateral pleural effusions right greater than left with bibasilar   airspace consolidation, possibly atelectasis.   3.  Chronic pancreatic cystic lesions incompletely characterized without   contrast, suggestive of side branch intraductal papillary mucinous 
Basophils % 1 0 - 1 %    Immature Granulocytes 0 %    Neutrophils Absolute 4.8 1.8 - 8.0 K/UL    Lymphocytes Absolute 1.0 0.8 - 3.5 K/UL    Monocytes Absolute 0.8 0.0 - 1.0 K/UL    Eosinophils Absolute 0.2 0.0 - 0.4 K/UL    Basophils Absolute 0.1 0.0 - 0.1 K/UL    Absolute Immature Granulocyte 0.0 K/UL    Differential Type Smear Scanned      RBC Comment Microcytosis  2+        RBC Comment Hypochromia  2+        RBC Comment Ovalocytes  1+        RBC Comment Target Cells  1+        RBC Comment Anisocytosis  1+       Blood Gas, Arterial    Collection Time: 01/21/24 11:53 AM   Result Value Ref Range    pH, Arterial 7.42 7.35 - 7.45      pCO2, Arterial 70 (H) 35 - 45 mmHg    pO2, Arterial 91 80 - 100 mmHg    O2 Sat, Arterial 97 95 - 99 %    HCO3, Arterial 45 (H) 22 - 26 mmol/L    Base Excess, Arterial 17.5 (H) 0 - 3 mmol/L    O2 Method Nasal Cannula      FIO2 Arterial 28 %    Source Arterial      Site Right Radial      Ze Test YES      Carboxyhgb, Arterial 1.2 1 - 2 %    Methemoglobin, Arterial 0.3 0 - 1.4 %    Oxyhemoglobin 95.4 95 - 99 %    Performed by: Marta Reyna     Temperature 98.0         MRI THORACIC SPINE WO CONTRAST   Final Result      1. T5 acute versus subacute partial compression fracture.   2. Chronic partial compression fractures of C5, C6, T4, T9, L1, and L3. Previous   kyphoplasty at L1.   3. Moderate stenoses C5-C7 and L2-L3.   4. No stenosis in the thoracic spine.   5. Otherwise, mild stenoses in the lumbar spine.          MRI LUMBAR SPINE WO CONTRAST   Final Result      1. T5 acute versus subacute partial compression fracture.   2. Chronic partial compression fractures of C5, C6, T4, T9, L1, and L3. Previous   kyphoplasty at L1.   3. Moderate stenoses C5-C7 and L2-L3.   4. No stenosis in the thoracic spine.   5. Otherwise, mild stenoses in the lumbar spine.          XR CHEST PORTABLE   Final Result      Stable mild to moderate edema pattern. Decreasing small to moderate right   pleural effusion 
  2.  Small bilateral pleural effusions right greater than left with bibasilar   airspace consolidation, possibly atelectasis.   3.  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   4.  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   5.  Diverticulosis of the sigmoid colon without evidence of active inflammation.   6.  Chronic L3 superior endplate compression fracture.      CTA CHEST W WO CONTRAST   Final Result   Age-indeterminate thoracic compression fractures   Bilateral pleural exudates, moderate on the right and small on the left   Dilated cardiomyopathy   No pulmonary embolism         XR CHEST PORTABLE   Final Result   Cardiomegaly with bibasilar atelectasis and a small right pleural   effusion.         [unfilled]  Assessment:     Principal Problem:    Acute respiratory failure with hypoxia (HCC)  Active Problems:    Acute hypoxic respiratory failure (HCC)    Accelerated essential hypertension    Anemia  Resolved Problems:    * No resolved hospital problems. *    She was in the hospital for  hypoxia respiratory failure, had aspiration pneumonia, with asked history of atrial fibrillation stage heart failure,     Blood test finding including anemia, severe, microcytic, but no signs of active GI bleeding,  Patient was on anticoagulation, may need further anticoagulation for patient history of atrial fibrillation.     Hx of esophageal dilatation with fluid.    HB above 7 after blood transfusion   Due to anemia, may need further evaluation to make sure there is no underlying malignancy.     Plan for GI:  monitor hemoglobin,   iron replacement,  PPI to p.o.,  Waiting for stool test result  Will follow the patient peripherally, to see if patient is a respiratory stable for the endoscopy procedure      Signed By: Kenneth Ward MD     January 18, 2024          Thank you for allowing me to participate in this 
bowel obstruction or ascites.   2.  Small bilateral pleural effusions right greater than left with bibasilar   airspace consolidation, possibly atelectasis.   3.  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   4.  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   5.  Diverticulosis of the sigmoid colon without evidence of active inflammation.   6.  Chronic L3 superior endplate compression fracture.      CTA CHEST W WO CONTRAST   Final Result   Age-indeterminate thoracic compression fractures   Bilateral pleural exudates, moderate on the right and small on the left   Dilated cardiomyopathy   No pulmonary embolism         XR CHEST PORTABLE   Final Result   Cardiomegaly with bibasilar atelectasis and a small right pleural   effusion.      FL MODIFIED BARIUM SWALLOW W VIDEO    (Results Pending)             Assessment:     Acute respiratory failure with hypoxia  Acute on chronic diastolic congestive heart failure  Pulmonary hypertension, venous  BL pleural effusion  Anemia   Afib     Plan:     Patient admitted to the hospital  Will be watched here closely    ABG from 1/21 was reviewed showing 7.42/70/91/45/97 on 2 L.  Yesterday she had an episode of desaturation in the mid 70% range on room air.  Placed on nasal cannula O2.  Currently at 1 L.  She was taken off oxygen but she desaturated in the high 80% range.  Back on 1 L of oxygen.    She appears to have chronic CO2 retention.  With normal pH.  Chest x-ray and CT scan reviewed  No evidence of PE  Patient's respiratory failure and hypoxia are due to underlying acute on chronic diastolic congestive heart failure with pulmonary edema and bilateral pleural effusions  Somewhat improved post thoracentesis  600 cc were removed from right pleural space by interventional radiology  Thoracentesis was performed prior to my consultation.  Will follow-up and see what testing 
5:37 AM   Result Value Ref Range    NT Pro-BNP 2,359 (H) <450 pg/mL       XR CHEST PORTABLE   Final Result      Stable mild to moderate edema pattern. Decreasing small to moderate right   pleural effusion with improving hazy right basilar atelectasis/airspace disease.   Stable small-to-moderate left pleural effusion with similar left basilar   collapse/consolidation.           IR GUIDED THORACENTESIS PLEURAL   Final Result   Technically successful ultrasound guided right thoracentesis yielding   approximately 600 ml of fluid.         CT ABDOMEN PELVIS WO CONTRAST Additional Contrast? None   Final Result   1.  No bowel obstruction or ascites.   2.  Small bilateral pleural effusions right greater than left with bibasilar   airspace consolidation, possibly atelectasis.   3.  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   4.  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   5.  Diverticulosis of the sigmoid colon without evidence of active inflammation.   6.  Chronic L3 superior endplate compression fracture.      CTA CHEST W WO CONTRAST   Final Result   Age-indeterminate thoracic compression fractures   Bilateral pleural exudates, moderate on the right and small on the left   Dilated cardiomyopathy   No pulmonary embolism         XR CHEST PORTABLE   Final Result   Cardiomegaly with bibasilar atelectasis and a small right pleural   effusion.                Assessment:     Acute respiratory failure with hypoxia  Acute on chronic diastolic congestive heart failure  Pulmonary hypertension, venous  BL pleural effusion  Anemia   Afib     Plan:     Patient admitted to the hospital  Will be watched here closely    Currently on 2 L nasal cannula oxygen  Will use supplemental oxygen as needed to keep saturation above 92%    Chest x-ray and CT scan reviewed  No evidence of PE  Patient's respiratory failure and hypoxia are 
GI input  May need endoscopic evaluation if chronic anticoagulation is needed    DVT and GI prophylaxis    Questions of patient were answered at bedside in detail  Case discussed in detail with RN, RT, and care team  Thank you for involving me in the care of this patient  I will follow with you closely during hospitalization     Time spent more than 30 minutes in direct patient care with no overlap    Arden Paz MD  [facility] ([facility])  1/20/2024  11:25 AM             
medical notes like this available to patient in the interest of transparency, however please be advised this is a medical document.  It is intended  as peer to peer communication. It is written in the medical language and may contain abbreviation or verbiage that are unfamiliar. It may appear blunt or direct.  Medical documents are intended to carry relevant information, facts as evident.  And the clinic opinions of the practitioner.  This note maybe transcribed  using a voice dictation system, voice-recognition errors may occur, this should not be taken to alter the contents or meaning for this note.    Cc Referring Physician   Jaxon Peres MD     
reviewed today's most current labs and imaging studies.  Pertinent labs include:  Recent Labs     01/21/24  0555 01/22/24  0602   WBC 6.9 8.0   HGB 7.9* 9.1*   HCT 30.4* 35.4    154       Recent Labs     01/21/24  0555 01/22/24  0602   * 134*   K 3.8 3.7   CL 90* 91*   CO2 41* 41*   BUN 25* 34*   PHOS 4.1  --          Signed: JELENA Frausto NP   
antibiotics, clinical improvement        This is dictation was done by dragon, computer voice recognition software.  Quite often unanticipated grammatical, syntax, homophones and other interpretive errors or inadvertently transcribed by the computer software.  Please excuse errors that have escaped final proofreading. Please contact me for any questions or details.  Thank you.     Signed By: Ariel Casillas MD     January 17, 2024

## 2024-01-24 NOTE — DISCHARGE SUMMARY
continue with O2 as prescribed. Patient had an overnight oximetry which showed desaturation of 3 hours and 16 minutes below 88% qualifying her for home O2. Patient will require nocturnal O2 at 2L via NC and 1 L during the daytime. Please follow-up with pulmonary in 2-3 weeks at discharge.           Suspected aspiration pneumonia: Please continue Augmentin and Doxycyline as prescribed. Please continue to follow dietary recommendations. Please follow-up with GI in the OP setting for any additional swallowing issues.        Acute anemia; Hemoglobin 8.9 was stable at the time of discharge.   Continue iron supplementation and daily protonix. Please follow-up with GI as needed.          HFpEF: Please continue lasix 40 mg daily, decreased metoprolol to 25 mg twice daily, and pravstatin.  Echo 1/17/2024 revealed normal left ventricular systolic function with EF of 60 to 65%.  Normal wall thickness.  Normal wall motion.  Normal diastolic function. Please follow-up with Dr. Leif Gonzales at discharge. Please call and schedule an appointment in 2-3 weeks for further evaluation and medical management.     Chronic A-fib on Xarelto  -Continue Xarelto and metoprolol. Please follow-up with Dr. Leif Gonzales at discharge. Please call and schedule an appointment in 2-3 weeks for further evaluation and medical management.       HTN and HLD  -Continue Imdur, lisinopril, metoprolol and pravastatin    Discharge Exam:  Patient seen and examined by me on discharge day.  Pertinent Findings:  Patient Vitals for the past 24 hrs:   BP Temp Temp src Pulse Resp SpO2 Height   01/24/24 0923 (!) 105/53 -- -- -- -- -- --   01/24/24 0900 -- -- -- -- -- 95 % --   01/24/24 0800 (!) 105/53 98 °F (36.7 °C) Oral 53 17 93 % --   01/24/24 0257 (!) 125/55 97.9 °F (36.6 °C) Oral 50 19 100 % --   01/23/24 1945 133/61 97.5 °F (36.4 °C) Oral 71 18 98 % --   01/23/24 1543 105/64 98.4 °F (36.9 °C) Oral 80 18 98 % --   01/23/24 1516 -- -- -- -- -- -- 1.549 m (5'

## 2024-01-24 NOTE — CARE COORDINATION
0755: Chart reviewed.     Per notes; patient is followed via cardiology, GI, pulmonology and speech.     Patient previously declined home health services.     CM will continue to follow patient and recs of medical team.    1435: Patient's son presented to desk with nurse.    CM reviewed therapy notes with son.    Choice letter received for [facility], placed on chart and referral sent.            
0820: Chart reviewed.    Per notes; patient is on IV Lasix followed via cardiology, GI, pulmonology and speech.    Kyphoplasty scheduled for today.    MBS pending.    Patient previously declined home health services.    CM will continue to follow patient and recs of medical team.      
1145: Discharge order notes and attached in [facility] to [facility].    Summary pending.    Room assignment pending.    CM messaged and telephoned [facility] admissions asking for room.    Patient will need medical transport due to oxygen demand.    1218: Summary attached in [facility]Port.    1228: When medical transportation with oxygen available, patient to discharge to:    [place] [address]    Room #214-B    Report to be called to [phone]    1248: CM telephoned Riley, patient's son to notify of above.    Understanding verbalized.    1400: [facility] states patient does not qualify for medical transport with therapy notes and H&P sent for review.    [facility] states they will not assist as they are not contracted with the hospital.    [facility] states they do not have a ambulance in the area today.    CM notified CM Lead.    Hospital to Home wheelchair transport with oxygen arranged for hospital to pay $222.00 around 4:45PM.    CM notified patient's sonRiley.    Understanding verbalized.    Discharge Checklist Completed.    Transition of Care Plan:    RUR: 18%  Prior Level of Functioning: Independent  Disposition: SNF  If SNF or IPR: Date FOC offered: 01/23/2024  Date FOC received: 01/23/2024  Accepting facility: [facility]  Date authorization started with reference number: N/A  Date authorization received and expires: N/A  Follow up appointments: Discharge Summary  DME needed: Oxygen with transportation  Transportation at discharge: Medical for oxygen  IM/IMM Medicare/ letter given: 01/24/2024  Is patient a  and connected with [facility]? No  If yes, was [place] transfer form completed and [facility] notified? N/A  Caregiver Contact: HarshalRiley  Discharge Caregiver contacted prior to discharge? Harshal Riley  Care Conference needed? No  Barriers to discharge: None    
CM met with patient at bedside to discuss discharge planning and possibility of home health with PT and OT.  Patient politely declined home health.  Current disposition is home/self.   
Discharge Planning   Services At/After Discharge None   [place] Resource Information Provided? No   Mode of Transport at Discharge Other (see comment)  ()   Confirm Follow Up Transport Family     CM met with pt ([facility]) & D/C Plan is home alone &  ( lives next door) to transport . Send Rxs to [facility] upon discharge. Uses walker.

## 2024-01-24 NOTE — DISCHARGE INSTR - COC
Status/Restrictions: No weight bearing restrictions  Other Medical Equipment (for information only, NOT a DME order):  walker  Other Treatments:     Patient's personal belongings (please select all that are sent with patient):    All personal belongings with patient.       CASE MANAGEMENT/SOCIAL WORK SECTION    Inpatient Status Date: ***    Readmission Risk Assessment Score:  Readmission Risk              Risk of Unplanned Readmission:  15           Discharging to Facility/ Agency   Name:   Address:  Phone:  Fax:    Dialysis Facility (if applicable)   Name:  Address:  Dialysis Schedule:  Phone:  Fax:    / signature: {Esignature:[id number]}    PHYSICIAN SECTION    Prognosis: {Prognosis:2467557043}    Condition at Discharge: { Patient Condition:186956267}    Rehab Potential (if transferring to Rehab): {Prognosis:4404147091}    Recommended Labs or Other Treatments After Discharge: ***    Physician Certification: I certify the above information and transfer of Melania Alvarenga  is necessary for the continuing treatment of the diagnosis listed and that she requires {Admit to Appropriate Level of Care:94205} for {GREATER/LESS:207885283} 30 days.     Update Admission H&P: {CHP DME Changes in HandP:454350340}    PHYSICIAN SIGNATURE:  {Esignature:880148750}

## 2024-01-24 NOTE — PLAN OF CARE
Problem: Discharge Planning  Goal: Discharge to home or other facility with appropriate resources  Outcome: Progressing     Problem: Skin/Tissue Integrity  Goal: Absence of new skin breakdown  Description: 1.  Monitor for areas of redness and/or skin breakdown  2.  Assess vascular access sites hourly  3.  Every 4-6 hours minimum:  Change oxygen saturation probe site  4.  Every 4-6 hours:  If on nasal continuous positive airway pressure, respiratory therapy assess nares and determine need for appliance change or resting period.  1/24/2024 1011 by Kaylene Mo RN  Outcome: Progressing  1/24/2024 0431 by Dinesh Layton RN  Outcome: Progressing     Problem: ABCDS Injury Assessment  Goal: Absence of physical injury  1/24/2024 1011 by Kaylene Mo RN  Outcome: Progressing  1/24/2024 0431 by Dinesh Layton RN  Outcome: Progressing     Problem: Safety - Adult  Goal: Free from fall injury  1/24/2024 1011 by Kaylene Mo RN  Outcome: Progressing  1/24/2024 0431 by Dinesh Layton RN  Outcome: Progressing     Problem: Chronic Conditions and Co-morbidities  Goal: Patient's chronic conditions and co-morbidity symptoms are monitored and maintained or improved  1/24/2024 1011 by Kaylene Mo RN  Outcome: Progressing  1/24/2024 0431 by Dinesh Layton RN  Outcome: Progressing     
  Problem: Discharge Planning  Goal: Discharge to home or other facility with appropriate resources  Outcome: Progressing     Problem: Skin/Tissue Integrity  Goal: Absence of new skin breakdown  Description: 1.  Monitor for areas of redness and/or skin breakdown  2.  Assess vascular access sites hourly  3.  Every 4-6 hours minimum:  Change oxygen saturation probe site  4.  Every 4-6 hours:  If on nasal continuous positive airway pressure, respiratory therapy assess nares and determine need for appliance change or resting period.  Outcome: Progressing     Problem: ABCDS Injury Assessment  Goal: Absence of physical injury  Outcome: Progressing     Problem: Safety - Adult  Goal: Free from fall injury  Outcome: Progressing     
  Problem: Discharge Planning  Goal: Discharge to home or other facility with appropriate resources  Outcome: Progressing     Problem: Skin/Tissue Integrity  Goal: Absence of new skin breakdown  Description: 1.  Monitor for areas of redness and/or skin breakdown  2.  Assess vascular access sites hourly  3.  Every 4-6 hours minimum:  Change oxygen saturation probe site  4.  Every 4-6 hours:  If on nasal continuous positive airway pressure, respiratory therapy assess nares and determine need for appliance change or resting period.  Outcome: Progressing     Problem: ABCDS Injury Assessment  Goal: Absence of physical injury  Outcome: Progressing     Problem: Safety - Adult  Goal: Free from fall injury  Outcome: Progressing     Problem: SLP Adult - Impaired Swallowing  Goal: By Discharge: Advance to least restrictive diet without signs or symptoms of aspiration for planned discharge setting.  See evaluation for individualized goals.  Description: Speech Therapy Swallow Goals    Initiated 1/21/2024    -Patient stated goal: I don't know    -Patient will tolerate Soft and bite sized and moderately thick liquids diet without clinical indicators of aspiration given min cues within 7 day(s).        -Patient will tolerate PO trials without clinical indicators of aspiration given min cues within 7 day(s).      -Patient will participate in modified barium swallow study within 7 day(s).      -Patient will demonstrate understanding of swallow safety precautions and aspiration precautions, diet recs with min cues within 7 day(s).          1/22/2024 1508 by Bonnie Cintron, SLP  Outcome: Progressing     Problem: Chronic Conditions and Co-morbidities  Goal: Patient's chronic conditions and co-morbidity symptoms are monitored and maintained or improved  Outcome: Progressing     
  Problem: Discharge Planning  Goal: Discharge to home or other facility with appropriate resources  Outcome: Progressing  Flowsheets (Taken 1/18/2024 0903)  Discharge to home or other facility with appropriate resources: Identify barriers to discharge with patient and caregiver     Problem: Skin/Tissue Integrity  Goal: Absence of new skin breakdown  Description: 1.  Monitor for areas of redness and/or skin breakdown  2.  Assess vascular access sites hourly  3.  Every 4-6 hours minimum:  Change oxygen saturation probe site  4.  Every 4-6 hours:  If on nasal continuous positive airway pressure, respiratory therapy assess nares and determine need for appliance change or resting period.  Outcome: Progressing     Problem: ABCDS Injury Assessment  Goal: Absence of physical injury  Outcome: Progressing     Problem: Safety - Adult  Goal: Free from fall injury  Outcome: Progressing     
    SUBJECTIVE:   Patient alert and agreeable to MBS.   MBS purpose and protocol discussed with pt prior to initiation of study. Pt expresses understanding.   OBJECTIVE:     Past Medical History:   Diagnosis Date    A-fib (HCC)     CAD (coronary artery disease)     Carotid atherosclerosis, bilateral     Carotid stent occlusion (HCC)     Hyperlipidemia     Hypertension     PVD (peripheral vascular disease) (HCC)     Stroke (HCC)      Past Surgical History:   Procedure Laterality Date    CAROTID STENT PLACEMENT      HYSTEROPLASTY      INCONTINENCE SURGERY      Blader sling     Prior Level of Function/Home Situation:   Social/Functional History  Lives With: Alone ( lives next door.)  Type of Home: House  Home Layout: One level  Home Access: Ramped entrance  Bathroom Shower/Tub: Tub/Shower unit  Bathroom Toilet: Standard  Bathroom Accessibility: Accessible  Home Equipment: Walker, standard  Receives Help From: Family  ADL Assistance: Independent  Homemaking Assistance: Independent  Homemaking Responsibilities: Yes  Ambulation Assistance: Needs assistance (Walker)  Transfer Assistance: Independent  Active : No  Occupation: Retired  Diet prior to admission: unknown  Current Diet:  puree, moderately thick      Type of Study: Modified barium swallow  Film Views: Lateral          Consistencies Administered:  Consistencies Administered: Pureed;Thin;Soft and Bite-Sized;Thin - cup;Thin - teaspoon;Mildly Thick- teaspoon;Moderately Thick - teaspoon    Presentation:  How Presented: Self-fed/presented;Cup/sip;SLP-fed/Presented;Spoon    Bolus acceptance:  Bolus Acceptance: No impairment    Bolus formation/control:    Bolus Formation/Control: No impairment  Propulsion: No impairment    Oral Residue:    Oral Residue: None    Pharyngeal Phase Initiation:       Timing:  Timing: No impairment  Penetration:  Penetration: None       Consistency(ies) penetrated: none  Aspiration:   Aspiration/Timing: No evidence of aspiration   
discharge setting.  See evaluation for individualized goals.  Description: FUNCTIONAL STATUS PRIOR TO ADMISSION:  Pt reports she was utilizing RW for ambulation and IND w/ BADLs; family A w/ IADLs.    HOME SUPPORT: Pt lives alone; son lives next door.    Occupational Therapy Goals:  Initiated 1/23/2024  Patient/Family stated goal: I want to get better  Patient will perform grooming in standing with Modified [place] within 7 day(s).  Patient will perform UB bathing with [place] within 7 day(s).  Patient will perform LB bathing with [place] within 7 day(s).  Patient will perform toilet transfers with Modified [place]  within 7 day(s).  Patient will perform all aspects of toileting with [place] within 7 day(s).  Patient will participate in upper extremity therapeutic exercise/activities with [place] within 7 day(s).    1/23/2024 1617 by Helen Marquez, SRINI  Outcome: Progressing     
individualized goals.  Description: Speech Therapy Swallow Goals    Initiated 1/21/2024    -Patient stated goal: I don't know    -Patient will tolerate Soft and bite sized and moderately thick liquids diet without clinical indicators of aspiration given min cues within 7 day(s).        -Patient will tolerate PO trials without clinical indicators of aspiration given min cues within 7 day(s).      -Patient will participate in modified barium swallow study within 7 day(s).      -Patient will demonstrate understanding of swallow safety precautions and aspiration precautions, diet recs with min cues within 7 day(s).          Outcome: Progressing       Patient will benefit from skilled intervention to address the above impairments.     PLAN :  Recommendations and Planned Interventions:  DIET RECOMMENDATIONS: Soft and bite sized and moderately thick liquids    SWALLOW SAFETY PRECAUTIONS: Upright positioning during po intake and after po intake for at least 30-60 minutes, slow rate, small-single sips and bites, STRICT aspiration and GERD precautions advised. Monitor for overt s/sx of aspiration. ST to follow.      Rec: MBS to further investigate swallow function s/t level of impairment at bedside vs overt s/sx of aspiration/penetration    Acute SLP Services: Yes, patient will be followed by speech-language pathology 5x/week to address goals. Patient's rehabilitation potential is considered to be Good.    Discharge Recommendations: To Be Determined     SUBJECTIVE:   Im okay.     OBJECTIVE:     Past Medical History:   Diagnosis Date    A-fib (HCC)     CAD (coronary artery disease)     Carotid atherosclerosis, bilateral     Carotid stent occlusion (HCC)     Hyperlipidemia     Hypertension     PVD (peripheral vascular disease) (HCC)     Stroke ([facility])      Past Surgical History:   Procedure Laterality Date    CAROTID STENT PLACEMENT      HYSTEROPLASTY      INCONTINENCE SURGERY      Blader sling     Prior Level of Function/Home 
Tool:  Represents activities that are increasingly more difficult (i.e. Bed mobility, Transfers, Gait).  Score 24 23 22-20 19-15 14-10 9-7 6   Modifier CH CI CJ CK CL CM CN         Physical Therapy Evaluation Charge Determination   History Examination Presentation Decision-Making   HIGH Complexity :3+ comorbidities / personal factors will impact the outcome/ POC  HIGH Complexity : 4+ Standardized tests and measures addressing body structure, function, activity limitation and / or participation in recreation  LOW Complexity : Stable, uncomplicated  Other outcome measures ampa 6  MEDIUM      Based on the above components, the patient evaluation is determined to be of the following complexity level: LOW    Pain Ratin/10 none reported  Pain Intervention(s):       Activity Tolerance:   Fair     After treatment patient left in no apparent distress:   Bed locked and in lowest position Patient left in no apparent distress in bed, Call bell within reach, Bed/ chair alarm activated, and Side rails x3 and nsg updated.    COMMUNICATION/EDUCATION:   The patient’s plan of care was discussed with: Occupational therapist, Registered nurse, and nurse practitioner    Patient Education  Education Given To: Patient  Education Provided: Plan of Care;Role of Therapy;Transfer Training;Precautions  Education Provided Comments: pt educated on log roll technique  Education Method: Verbal  Barriers to Learning: Hearing  Education Outcome: Verbalized understanding;Demonstrated understanding;Continued education needed    PT/OT sessions occurred together for increased safety of pt and clinician.       Thank you for this referral.  Elayne Lucia, PT  Minutes: 40

## (undated) DEVICE — LINE SAMP LNG AD ORAL NSL PT O2 TBNG SMRT CAPNOLN H +

## (undated) DEVICE — THE ENDO CARRY-ON PROCEDURE KIT CONTAINS ALL OF THE SUPPLIES AND INFECTION PREVENTION PRODUCTS NEEDED FOR ENDOSCOPIC PROCEDURES: Brand: ENDO CARRY-ON PROCEDURE KIT

## (undated) DEVICE — MOUTHPIECE ENDOSCP 20X27MM --